# Patient Record
Sex: FEMALE | Race: ASIAN | NOT HISPANIC OR LATINO | Employment: UNEMPLOYED | URBAN - METROPOLITAN AREA
[De-identification: names, ages, dates, MRNs, and addresses within clinical notes are randomized per-mention and may not be internally consistent; named-entity substitution may affect disease eponyms.]

---

## 2017-01-04 ENCOUNTER — GENERIC CONVERSION - ENCOUNTER (OUTPATIENT)
Dept: OTHER | Facility: OTHER | Age: 52
End: 2017-01-04

## 2017-01-04 ENCOUNTER — ALLSCRIPTS OFFICE VISIT (OUTPATIENT)
Dept: OTHER | Facility: OTHER | Age: 52
End: 2017-01-04

## 2017-01-09 LAB
CULT RSLT GENITAL (HISTORICAL): NORMAL
MISCELLANEOUS LAB TEST RESULT (HISTORICAL): NORMAL

## 2017-01-10 LAB
CHLAMYDIA TRACHOMATIS BY MOL. METHOD (HISTORICAL): NEGATIVE
N GONORRHOEAE, AMPLIFIED DNA (HISTORICAL): NEGATIVE

## 2017-01-23 ENCOUNTER — ALLSCRIPTS OFFICE VISIT (OUTPATIENT)
Dept: OTHER | Facility: OTHER | Age: 52
End: 2017-01-23

## 2017-02-06 ENCOUNTER — ALLSCRIPTS OFFICE VISIT (OUTPATIENT)
Dept: OTHER | Facility: OTHER | Age: 52
End: 2017-02-06

## 2017-02-06 DIAGNOSIS — Z12.31 ENCOUNTER FOR SCREENING MAMMOGRAM FOR MALIGNANT NEOPLASM OF BREAST: ICD-10-CM

## 2017-02-06 DIAGNOSIS — Z01.419 ENCOUNTER FOR GYNECOLOGICAL EXAMINATION WITHOUT ABNORMAL FINDING: ICD-10-CM

## 2017-02-06 PROCEDURE — 87624 HPV HI-RISK TYP POOLED RSLT: CPT | Performed by: FAMILY MEDICINE

## 2017-02-06 PROCEDURE — G0145 SCR C/V CYTO,THINLAYER,RESCR: HCPCS | Performed by: FAMILY MEDICINE

## 2017-02-07 ENCOUNTER — LAB REQUISITION (OUTPATIENT)
Dept: LAB | Facility: HOSPITAL | Age: 52
End: 2017-02-07
Payer: MEDICAID

## 2017-02-07 DIAGNOSIS — Z01.419 ENCOUNTER FOR GYNECOLOGICAL EXAMINATION WITHOUT ABNORMAL FINDING: ICD-10-CM

## 2017-02-10 LAB — HPV RRNA GENITAL QL NAA+PROBE: ABNORMAL

## 2017-02-13 LAB
LAB AP GYN PRIMARY INTERPRETATION: NORMAL
Lab: NORMAL

## 2017-02-22 ENCOUNTER — GENERIC CONVERSION - ENCOUNTER (OUTPATIENT)
Dept: OTHER | Facility: OTHER | Age: 52
End: 2017-02-22

## 2017-03-08 ENCOUNTER — HOSPITAL ENCOUNTER (OUTPATIENT)
Dept: RADIOLOGY | Facility: HOSPITAL | Age: 52
Discharge: HOME | End: 2017-03-08
Attending: FAMILY MEDICINE
Payer: MEDICAID

## 2017-03-08 DIAGNOSIS — Z12.31 ENCOUNTER FOR SCREENING MAMMOGRAM FOR MALIGNANT NEOPLASM OF BREAST: ICD-10-CM

## 2017-03-08 PROCEDURE — G0202 SCR MAMMO BI INCL CAD: HCPCS

## 2017-04-06 ENCOUNTER — ALLSCRIPTS OFFICE VISIT (OUTPATIENT)
Dept: OTHER | Facility: OTHER | Age: 52
End: 2017-04-06

## 2017-05-01 ENCOUNTER — ALLSCRIPTS OFFICE VISIT (OUTPATIENT)
Dept: OTHER | Facility: OTHER | Age: 52
End: 2017-05-01

## 2017-05-15 ENCOUNTER — ALLSCRIPTS OFFICE VISIT (OUTPATIENT)
Dept: OTHER | Facility: OTHER | Age: 52
End: 2017-05-15

## 2017-06-13 ENCOUNTER — TRANSCRIBE ORDERS (OUTPATIENT)
Dept: ADMINISTRATIVE | Facility: HOSPITAL | Age: 52
End: 2017-06-13

## 2017-06-13 DIAGNOSIS — R10.2 ADNEXAL TENDERNESS, RIGHT: Primary | ICD-10-CM

## 2017-06-20 ENCOUNTER — HOSPITAL ENCOUNTER (OUTPATIENT)
Dept: RADIOLOGY | Facility: HOSPITAL | Age: 52
Discharge: HOME | End: 2017-06-20
Attending: OBSTETRICS & GYNECOLOGY
Payer: COMMERCIAL

## 2017-06-20 DIAGNOSIS — R10.2 ADNEXAL TENDERNESS, RIGHT: ICD-10-CM

## 2017-06-20 PROCEDURE — 76856 US EXAM PELVIC COMPLETE: CPT

## 2017-06-20 PROCEDURE — 76830 TRANSVAGINAL US NON-OB: CPT

## 2017-08-27 ENCOUNTER — HOSPITAL ENCOUNTER (EMERGENCY)
Facility: HOSPITAL | Age: 52
Discharge: HOME | End: 2017-08-27
Admitting: EMERGENCY MEDICINE
Payer: COMMERCIAL

## 2017-08-27 VITALS
WEIGHT: 103 LBS | OXYGEN SATURATION: 99 % | RESPIRATION RATE: 18 BRPM | DIASTOLIC BLOOD PRESSURE: 63 MMHG | TEMPERATURE: 97 F | HEART RATE: 60 BPM | SYSTOLIC BLOOD PRESSURE: 116 MMHG

## 2017-08-27 DIAGNOSIS — H10.13 ALLERGIC CONJUNCTIVITIS, BILATERAL: Primary | ICD-10-CM

## 2017-08-27 PROCEDURE — 99283 EMERGENCY DEPT VISIT LOW MDM: CPT

## 2017-08-27 RX ORDER — TETRACAINE HYDROCHLORIDE 5 MG/ML
1 SOLUTION OPHTHALMIC ONCE
Status: COMPLETED | OUTPATIENT
Start: 2017-08-27 | End: 2017-08-27

## 2017-08-27 RX ORDER — CITALOPRAM 10 MG/1
10 TABLET ORAL DAILY
COMMUNITY
End: 2018-04-23 | Stop reason: SDUPTHER

## 2017-08-27 RX ORDER — CLONAZEPAM 0.5 MG/1
0.5 TABLET ORAL DAILY
COMMUNITY
End: 2018-07-30 | Stop reason: SDUPTHER

## 2017-08-27 RX ADMIN — FLUORESCEIN SODIUM 1 STRIP: 1 STRIP OPHTHALMIC at 14:39

## 2017-08-27 RX ADMIN — TETRACAINE HYDROCHLORIDE 1 DROP: 5 SOLUTION OPHTHALMIC at 14:39

## 2017-08-30 ENCOUNTER — ALLSCRIPTS OFFICE VISIT (OUTPATIENT)
Dept: OTHER | Facility: OTHER | Age: 52
End: 2017-08-30

## 2018-01-09 NOTE — RESULT NOTES
Message  Patient presented for colposcopy  Discussed results of pap smear and current guidelines recommend either a repeat pap with cotesting in 1 year or colposcopy  Patient elected for repeat pap  Discussed with Dr Eleazar Singh and Dr Vipul Crow        Signatures   Electronically signed by : ALBERTO Martínez ; Apr 10 2017 11:56AM EST                       (Author)

## 2018-01-11 NOTE — MISCELLANEOUS
Message   Recorded as Task   Date: 02/20/2017 03:19 PM, Created By: Sheron Waddell   Task Name: Med Renewal Request   Assigned To:  Thony Salazar   Regarding Patient: Jaqui Ramirez, Status: Active   Comment:    Sheron Waddell - 20 Feb 2017 3:19 PM     TASK CREATED  PT RETURNED YOU CALL,   PLEASE CALL PT WHEN YOU ARE AVAILABLE  THANKS  [phone] CELL PHONE   Sheron Waddell - 21 Feb 2017 5:44 PM     TASK EDITED  CELL PHONE [phone] THIS NUMBER   I spoke to patient re pap results will schedule colposcopy with our office instructions      Signatures   Electronically signed by : ALBERTO Beltran ; Feb 22 2017 12:11PM EST                       (Author)

## 2018-01-12 VITALS
HEIGHT: 62 IN | WEIGHT: 102 LBS | DIASTOLIC BLOOD PRESSURE: 70 MMHG | RESPIRATION RATE: 18 BRPM | SYSTOLIC BLOOD PRESSURE: 118 MMHG | OXYGEN SATURATION: 100 % | TEMPERATURE: 98.1 F | HEART RATE: 69 BPM | BODY MASS INDEX: 18.77 KG/M2

## 2018-01-13 VITALS
BODY MASS INDEX: 19.32 KG/M2 | HEART RATE: 68 BPM | RESPIRATION RATE: 18 BRPM | WEIGHT: 105 LBS | SYSTOLIC BLOOD PRESSURE: 90 MMHG | DIASTOLIC BLOOD PRESSURE: 60 MMHG | HEIGHT: 62 IN | OXYGEN SATURATION: 98 %

## 2018-01-13 VITALS
TEMPERATURE: 98.8 F | BODY MASS INDEX: 19.14 KG/M2 | HEART RATE: 76 BPM | HEIGHT: 62 IN | SYSTOLIC BLOOD PRESSURE: 100 MMHG | DIASTOLIC BLOOD PRESSURE: 60 MMHG | RESPIRATION RATE: 16 BRPM | WEIGHT: 104 LBS

## 2018-01-14 VITALS
DIASTOLIC BLOOD PRESSURE: 40 MMHG | HEIGHT: 62 IN | RESPIRATION RATE: 18 BRPM | HEART RATE: 75 BPM | BODY MASS INDEX: 18.95 KG/M2 | OXYGEN SATURATION: 99 % | SYSTOLIC BLOOD PRESSURE: 90 MMHG | WEIGHT: 103 LBS

## 2018-01-14 VITALS
SYSTOLIC BLOOD PRESSURE: 100 MMHG | TEMPERATURE: 97.4 F | WEIGHT: 106 LBS | OXYGEN SATURATION: 98 % | BODY MASS INDEX: 19.51 KG/M2 | RESPIRATION RATE: 20 BRPM | DIASTOLIC BLOOD PRESSURE: 60 MMHG | HEART RATE: 78 BPM | HEIGHT: 62 IN

## 2018-01-14 VITALS
WEIGHT: 106.44 LBS | HEIGHT: 62 IN | HEART RATE: 66 BPM | SYSTOLIC BLOOD PRESSURE: 96 MMHG | TEMPERATURE: 96 F | DIASTOLIC BLOOD PRESSURE: 62 MMHG | RESPIRATION RATE: 20 BRPM | OXYGEN SATURATION: 99 % | BODY MASS INDEX: 19.59 KG/M2

## 2018-01-14 NOTE — PROGRESS NOTES
Assessment    1  Cough (786 2) (R05)   2  Encounter for smoking cessation counseling (V65 42,305 1) (Z71 6,Z72 0)    Plan  Cough    · Benzonatate 100 MG Oral Capsule; TAKE 1 CAPSULE 3 TIMES DAILY AS  NEEDED   · Follow-up visit in 3 months Evaluation and Treatment  Follow-up  Status: Hold For -  Scheduling  Requested for: [id number]    Discussion/Summary    Will try benzozenate cont sx rx will f/u with pfts and cxr if no improvement discussed smoking cessation will try patch  Chief Complaint    1  Cold Symptoms  Cough x 2 months      History of Present Illness  HPI: 53year old F presents to the office for a cough that has lasted since a week after Thanksgiving  The cough was dry for the entire duration up until yesterday when she had a clear sputum  Denies fever, SOB, wheezing, CP, nasal congestion, and headaches  The patient does have midsternal tenderness and sore throat due to the cough  The patient believes it is due to her smoking and is interested in cutting down and possibly quitting  otherwise had seen gyn had tests done everything ok        Review of Systems    Constitutional: No fever, no chills, feels well, no tiredness, no recent weight gain or loss  ENT: no ear ache, no loss of hearing, no nosebleeds or nasal discharge, no sore throat or hoarseness  Cardiovascular: no complaints of slow or fast heart rate, no chest pain, no palpitations, no leg claudication or lower extremity edema  Respiratory: as noted in HPI  Gastrointestinal: no complaints of abdominal pain, no constipation, no nausea or diarrhea, no vomiting, no bloody stools  Genitourinary: no complaints of dysuria, no incontinence, no pelvic pain, no dysmenorrhea, no vaginal discharge or abnormal vaginal bleeding  Musculoskeletal: no complaints of arthralgia, no myalgia, no joint swelling or stiffness, no limb pain or swelling  Integumentary: no complaints of skin rash or lesion, no itching or dry skin, no skin wounds  Neurological: no complaints of headache, no confusion, no numbness or tingling, no dizziness or fainting  Active Problems    1  Acne (706 1) (L70 9)   2  Depression with anxiety (300 4) (F41 8)   3  Genital lesion, female (629 89) (N94 9)   4  Hyperlipemia, mixed (272 2) (E78 2)   5  Psoriasis (696 1) (L40 9)    Family History    1  Family history of diabetes mellitus (V18 0) (Z83 3)    2  Family history of hypercholesterolemia (V18 19) (Z83 49)    Social History    · Never smoker  The social history was reviewed and is unchanged  Current Meds   1  Citalopram Hydrobromide 20 MG Oral Tablet; TAKE 1 TABLET DAILY AS DIRECTED; Therapy: 22Dvr1034 to (Evaluate:55Ata0147)  Requested for: 11Aug2015; Last   Rx:11Aug2015 Ordered   2  ClonazePAM 0 5 MG Oral Tablet; TAKE ONE TABLET BY MOUTH ONCE DAILY; Therapy: 13HXK9800 to (Evaluate:33Vby3346)  Requested for: 11Aug2015; Last   Rx:11Aug2015 Ordered   3  Finacea 15 % External Gel; USE AS DIRECTED; Therapy: 96Hpk2650 to (Last Rx:32Whb5114) Ordered    The medication list was reviewed and updated today  Allergies    1  No Known Drug Allergies    Vitals   Recorded: 06HEX8575 09:38AM   Temperature 98 5 F   Heart Rate 72   Respiration 20   Systolic 94   Diastolic 64   Height 5 ft 2 in   Weight 107 lb    BMI Calculated 19 57   BSA Calculated 1 47   O2 Saturation 99     Physical Exam    Constitutional   General appearance: No acute distress, well appearing and well nourished  Eyes   Conjunctiva and lids: No swelling, erythema or discharge  Ears, Nose, Mouth, and Throat   External inspection of ears and nose: Normal     Otoscopic examination: Tympanic membranes translucent with normal light reflex  Canals patent without erythema  Nasal mucosa, septum, and turbinates: Normal without edema or erythema  Oropharynx: Normal with no erythema, edema, exudate or lesions  Pulmonary   Auscultation of lungs: Clear to auscultation      Cardiovascular Auscultation of heart: Normal rate and rhythm, normal S1 and S2, without murmurs  Examination of extremities for edema and/or varicosities: Normal     Abdomen   Abdomen: Non-tender, no masses  Liver and spleen: No hepatomegaly or splenomegaly  Musculoskeletal neg  Skin   Skin and subcutaneous tissue: Normal without rashes or lesions  Neurologic no focl findings     Psychiatric   Orientation to person, place, and time: Normal     Mood and affect: Normal          Results/Data  PHQ-2 Adult Depression Screening 28Jan2016 09:41AM User, A[facility]     Test Name Result Flag Reference   PHQ-2 Adult Depression Score 0     Q1: 0, Q2: 0   PHQ-2 Adult Depression Screening Negative       [employer]Calcs - Health Calculators 16RPA1622 09:41AM User, [facility]     Test Name Result Flag Reference   SBIRT Screen - Tobacco Screening Result Positive         Signatures   Electronically signed by : ALBERTO Juan ; Jan 28 2016 12:43PM EST                       (Author)

## 2018-01-15 VITALS
HEART RATE: 74 BPM | BODY MASS INDEX: 19.51 KG/M2 | DIASTOLIC BLOOD PRESSURE: 62 MMHG | RESPIRATION RATE: 18 BRPM | WEIGHT: 106 LBS | SYSTOLIC BLOOD PRESSURE: 104 MMHG | HEIGHT: 62 IN

## 2018-01-18 ENCOUNTER — ALLSCRIPTS OFFICE VISIT (OUTPATIENT)
Dept: OTHER | Facility: OTHER | Age: 53
End: 2018-01-18

## 2018-01-18 ENCOUNTER — GENERIC CONVERSION - ENCOUNTER (OUTPATIENT)
Dept: OTHER | Facility: OTHER | Age: 53
End: 2018-01-18

## 2018-01-24 VITALS
OXYGEN SATURATION: 99 % | DIASTOLIC BLOOD PRESSURE: 70 MMHG | HEART RATE: 60 BPM | TEMPERATURE: 97.8 F | WEIGHT: 104 LBS | SYSTOLIC BLOOD PRESSURE: 100 MMHG | BODY MASS INDEX: 19.02 KG/M2 | RESPIRATION RATE: 16 BRPM

## 2018-03-08 ENCOUNTER — TELEPHONE (OUTPATIENT)
Dept: GASTROENTEROLOGY | Facility: AMBULARY SURGERY CENTER | Age: 53
End: 2018-03-08

## 2018-03-14 ENCOUNTER — TRANSCRIBE ORDERS (OUTPATIENT)
Dept: ADMINISTRATIVE | Facility: HOSPITAL | Age: 53
End: 2018-03-14

## 2018-03-14 DIAGNOSIS — Z12.39 SCREENING BREAST EXAMINATION: Primary | ICD-10-CM

## 2018-03-19 ENCOUNTER — HOSPITAL ENCOUNTER (OUTPATIENT)
Dept: RADIOLOGY | Facility: HOSPITAL | Age: 53
Discharge: HOME | End: 2018-03-19
Attending: FAMILY MEDICINE
Payer: COMMERCIAL

## 2018-03-19 DIAGNOSIS — Z12.39 SCREENING BREAST EXAMINATION: ICD-10-CM

## 2018-03-19 PROCEDURE — 77067 SCR MAMMO BI INCL CAD: CPT

## 2018-03-23 ENCOUNTER — TELEPHONE (OUTPATIENT)
Dept: FAMILY MEDICINE CLINIC | Facility: CLINIC | Age: 53
End: 2018-03-23

## 2018-03-23 DIAGNOSIS — Z12.11 SCREENING FOR COLON CANCER: Primary | ICD-10-CM

## 2018-04-18 ENCOUNTER — TELEPHONE (OUTPATIENT)
Dept: GASTROENTEROLOGY | Facility: CLINIC | Age: 53
End: 2018-04-18

## 2018-04-23 DIAGNOSIS — F41.9 ANXIETY: Primary | ICD-10-CM

## 2018-04-23 RX ORDER — CITALOPRAM 10 MG/1
10 TABLET ORAL DAILY
Qty: 30 TABLET | Refills: 5 | Status: SHIPPED | OUTPATIENT
Start: 2018-04-23 | End: 2018-10-11 | Stop reason: SDUPTHER

## 2018-05-10 ENCOUNTER — OFFICE VISIT (OUTPATIENT)
Dept: FAMILY MEDICINE CLINIC | Facility: CLINIC | Age: 53
End: 2018-05-10
Payer: COMMERCIAL

## 2018-05-10 VITALS
DIASTOLIC BLOOD PRESSURE: 60 MMHG | TEMPERATURE: 97.7 F | SYSTOLIC BLOOD PRESSURE: 100 MMHG | BODY MASS INDEX: 18.58 KG/M2 | HEART RATE: 84 BPM | HEIGHT: 62 IN | WEIGHT: 101 LBS | OXYGEN SATURATION: 100 %

## 2018-05-10 DIAGNOSIS — R09.82 POST-NASAL DRAINAGE: Primary | ICD-10-CM

## 2018-05-10 PROCEDURE — 3008F BODY MASS INDEX DOCD: CPT | Performed by: FAMILY MEDICINE

## 2018-05-10 PROCEDURE — 99213 OFFICE O/P EST LOW 20 MIN: CPT | Performed by: FAMILY MEDICINE

## 2018-05-10 RX ORDER — DOXYCYCLINE HYCLATE 100 MG/1
CAPSULE ORAL
Refills: 0 | COMMUNITY
Start: 2018-02-02 | End: 2018-07-23 | Stop reason: ALTCHOICE

## 2018-05-10 RX ORDER — FLUTICASONE PROPIONATE 50 MCG
1 SPRAY, SUSPENSION (ML) NASAL DAILY
Qty: 16 G | Refills: 0 | Status: SHIPPED | OUTPATIENT
Start: 2018-05-10 | End: 2018-07-23 | Stop reason: ALTCHOICE

## 2018-05-10 NOTE — PROGRESS NOTES
Assessment/Plan:    Post-nasal drainage  A/P:Patient complains dry cough likely secondary to postnasal drip  Physical exam was benign except erythematous and edematous nasal turbinates  Will start on Flonase which was called in during this visit  Diagnoses and all orders for this visit:    Post-nasal drainage  -     fluticasone (FLONASE) 50 mcg/act nasal spray; 1 spray into each nostril daily    Other orders  -     doxycycline hyclate (VIBRAMYCIN) 100 mg capsule;           Subjective:      Patient ID: Lotus Avila is a 47 y o  female  Patient is a 60-year-old female here for cold symptoms for 5 days  According to patient, she started out with runny nose, and later dry cough  Her runny nose improved however she now has congestion, frontal headache and some postnasal dripping  Denies any fever, sore throat, SOB, chest pain, nausea, vomiting, or diarrhea  The following portions of the patient's history were reviewed and updated as appropriate: allergies, current medications, past family history, past medical history, past social history, past surgical history and problem list     Review of Systems   Constitutional: Negative for fever  HENT: Positive for congestion and postnasal drip  Eyes: Negative  Respiratory: Negative for chest tightness and shortness of breath  Cardiovascular: Negative for chest pain  Gastrointestinal: Negative  Musculoskeletal: Negative  Skin: Negative  Neurological: Positive for headaches  Negative for light-headedness  Psychiatric/Behavioral: Negative  Objective:      /60   Pulse 84   Temp 97 7 °F (36 5 °C) (Tympanic)   Ht 5' 2" (1 575 m)   Wt 45 8 kg (101 lb)   SpO2 100%   BMI 18 47 kg/m²          Physical Exam   Constitutional: She is oriented to person, place, and time  She appears well-developed and well-nourished  No distress  HENT:   Head: Normocephalic and atraumatic     Nose: Nose normal    Mouth/Throat: Oropharynx is clear and moist  No oropharyngeal exudate  Bilateral edematous and erythematous nasal turbinates  Eyes: Conjunctivae and EOM are normal  Pupils are equal, round, and reactive to light  No scleral icterus  Neck: Normal range of motion  Neck supple  No thyromegaly present  Cardiovascular: Normal rate, regular rhythm and normal heart sounds  Pulmonary/Chest: Effort normal and breath sounds normal  No respiratory distress  She has no wheezes  She has no rales  She exhibits no tenderness  Abdominal: Soft  Bowel sounds are normal  She exhibits no distension and no mass  There is no tenderness  There is no rebound and no guarding  Musculoskeletal: Normal range of motion  She exhibits no edema, tenderness or deformity  Lymphadenopathy:     She has no cervical adenopathy  Neurological: She is alert and oriented to person, place, and time  Skin: Skin is warm and dry  No rash noted  No erythema  No pallor  Psychiatric: She has a normal mood and affect  Nursing note and vitals reviewed

## 2018-05-14 ENCOUNTER — HOSPITAL ENCOUNTER (EMERGENCY)
Facility: HOSPITAL | Age: 53
Discharge: HOME | End: 2018-05-14
Attending: EMERGENCY MEDICINE
Payer: COMMERCIAL

## 2018-05-14 ENCOUNTER — APPOINTMENT (EMERGENCY)
Dept: RADIOLOGY | Facility: HOSPITAL | Age: 53
End: 2018-05-14
Payer: COMMERCIAL

## 2018-05-14 VITALS
HEIGHT: 62 IN | HEART RATE: 78 BPM | DIASTOLIC BLOOD PRESSURE: 61 MMHG | TEMPERATURE: 98.4 F | OXYGEN SATURATION: 100 % | SYSTOLIC BLOOD PRESSURE: 137 MMHG | BODY MASS INDEX: 19.32 KG/M2 | WEIGHT: 105 LBS | RESPIRATION RATE: 18 BRPM

## 2018-05-14 DIAGNOSIS — S16.1XXA NECK MUSCLE STRAIN: Primary | ICD-10-CM

## 2018-05-14 PROCEDURE — 96372 THER/PROPH/DIAG INJ SC/IM: CPT

## 2018-05-14 PROCEDURE — 99283 EMERGENCY DEPT VISIT LOW MDM: CPT

## 2018-05-14 PROCEDURE — 72125 CT NECK SPINE W/O DYE: CPT

## 2018-05-14 RX ORDER — KETOROLAC TROMETHAMINE 30 MG/ML
60 INJECTION, SOLUTION INTRAMUSCULAR; INTRAVENOUS ONCE
Status: COMPLETED | OUTPATIENT
Start: 2018-05-14 | End: 2018-05-14

## 2018-05-14 RX ORDER — CYCLOBENZAPRINE HCL 10 MG
10 TABLET ORAL 2 TIMES DAILY PRN
Qty: 20 TABLET | Refills: 0 | Status: SHIPPED | OUTPATIENT
Start: 2018-05-14 | End: 2018-07-23 | Stop reason: ALTCHOICE

## 2018-05-14 RX ORDER — NAPROXEN 500 MG/1
500 TABLET ORAL 2 TIMES DAILY WITH MEALS
Qty: 30 TABLET | Refills: 0 | Status: SHIPPED | OUTPATIENT
Start: 2018-05-14 | End: 2018-07-23 | Stop reason: ALTCHOICE

## 2018-05-14 RX ADMIN — KETOROLAC TROMETHAMINE 60 MG: 30 INJECTION, SOLUTION INTRAMUSCULAR at 13:46

## 2018-05-14 NOTE — DISCHARGE INSTRUCTIONS
Muscle Strain   WHAT YOU NEED TO KNOW:   A muscle strain is a twist, pull, or tear of a muscle or tendon  A tendon is a strong elastic tissue that connects a muscle to a bone  Signs of a strained muscle include bruising and swelling over the area, pain with movement, and loss of strength  DISCHARGE INSTRUCTIONS:   Return to the emergency department if:   · You suddenly cannot feel or move your injured muscle  Contact your healthcare provider if:   · Your pain and swelling worsen or do not go away  · You have questions or concerns about your condition or care  Medicines:   · NSAIDs  help decrease swelling and pain or fever  This medicine is available with or without a doctor's order  NSAIDs can cause stomach bleeding or kidney problems in certain people  If you take blood thinner medicine, always ask your healthcare provider if NSAIDs are safe for you  Always read the medicine label and follow directions  · Muscle relaxers  help decrease pain and muscle spasms  · Take your medicine as directed  Contact your healthcare provider if you think your medicine is not helping or if you have side effects  Tell him of her if you are allergic to any medicine  Keep a list of the medicines, vitamins, and herbs you take  Include the amounts, and when and why you take them  Bring the list or the pill bottles to follow-up visits  Carry your medicine list with you in case of an emergency  Follow up with your healthcare provider as directed: Your healthcare provider may suggest that you have a follow-up visit before you go back to your usual activity  Write down your questions so you remember to ask them during your visits  Self-care:   · 3 to 7 days after the injury:  Use Rest, Ice, Compression, and Elevation (RICE) to help stop bruising and decrease pain and swelling  ¨ Rest:  Rest your muscle to allow your injury to heal  When the pain decreases, begin normal, slow movements   For mild and moderate muscle strains, you should rest your muscles for about 2 days  However, if you have a severe muscle strain, you should rest for 10 to 14 days  You may need to use crutches to walk if your muscle strain is in your legs or lower body  ¨ Ice:  Put an ice pack on the injured area  Put a towel between the ice pack and your skin  Do not put the ice pack directly on your skin  You can use a package of frozen peas instead of an ice pack  ¨ Compression:  You may need to wrap an elastic bandage around the area to decrease swelling  It should be tight enough for you to feel support  Do not wrap it too tightly  ¨ Elevation:  Keep the injured muscle raised above your heart if possible  For example if you have a strain of your lower leg muscle, lie down and prop your leg up on pillows  This helps decrease pain and swelling  · 3 to 21 days after the injury:  Start to slowly and regularly exercise your muscle  This will help it heal  If you feel pain, decrease how hard you are exercising  · 1 to 6 weeks after the injury:  Stretch the injured muscle  Hold the stretch for about 30 seconds  Do this 4 times a day  You may stretch the muscle until you feel a slight pull  Stop stretching if you feel pain  · 2 weeks to 6 months after the injury:  The goal of this phase is to return to the activity you were doing before the injury happened, without hurting the muscle again  · 3 weeks to 6 months after the injury:  Keep stretching and strengthening your muscles to avoid injury  Slowly increase the time and distance that you exercise  You may have signs and symptoms of muscle strain 6 months after the injury, even if you do things to help it heal  In this case, you may need surgery on the muscle  © 2017 [employer] Information is for End User's use only and may not be sold, redistributed or otherwise used for commercial purposes   All illustrations and images included in CareNotes® are the copyrighted property of [employer]  or [employer]  The above information is an  only  It is not intended as medical advice for individual conditions or treatments  Talk to your doctor, nurse or pharmacist before following any medical regimen to see if it is safe and effective for you

## 2018-05-15 ENCOUNTER — VBI (OUTPATIENT)
Dept: FAMILY MEDICINE CLINIC | Facility: CLINIC | Age: 53
End: 2018-05-15

## 2018-05-15 NOTE — TELEPHONE ENCOUNTER
Pt was seen in [address] on 5/14/18  CC: Neck Pain  DX: Neck muscle strain Post-nasal drainage  Left message  Informed Pt of  on call, hours, and phone number

## 2018-05-29 ENCOUNTER — OFFICE VISIT (OUTPATIENT)
Dept: FAMILY MEDICINE CLINIC | Facility: CLINIC | Age: 53
End: 2018-05-29
Payer: COMMERCIAL

## 2018-05-29 VITALS
WEIGHT: 102 LBS | RESPIRATION RATE: 16 BRPM | SYSTOLIC BLOOD PRESSURE: 100 MMHG | TEMPERATURE: 97.5 F | BODY MASS INDEX: 18.66 KG/M2 | DIASTOLIC BLOOD PRESSURE: 60 MMHG

## 2018-05-29 DIAGNOSIS — R05.9 COUGH IN ADULT: Primary | ICD-10-CM

## 2018-05-29 PROBLEM — R87.811 VAGINAL HIGH RISK HPV DNA TEST POSITIVE: Status: ACTIVE | Noted: 2017-02-22

## 2018-05-29 PROBLEM — R46.81 OBSESSIVE-COMPULSIVE BEHAVIOR: Status: ACTIVE | Noted: 2017-01-23

## 2018-05-29 PROBLEM — L71.9 ROSACEA: Status: ACTIVE | Noted: 2017-02-06

## 2018-05-29 PROBLEM — N95.1 PERI-MENOPAUSE: Status: ACTIVE | Noted: 2017-05-15

## 2018-05-29 PROCEDURE — 99213 OFFICE O/P EST LOW 20 MIN: CPT | Performed by: FAMILY MEDICINE

## 2018-05-29 NOTE — PROGRESS NOTES
Assessment/Plan:    No problem-specific Assessment & Plan notes found for this encounter  Diagnoses and all orders for this visit:    Cough in adult       - No physical abnormalities noted on examination  Patient has remained afebrile, has a cough, no lymphadenopathy, no pharyngeal erythema or exudate noted on exam today  Likely viral  No need for antibiotics at this time  - Counseled patient to monitor for fevers  - Counseled the patient on supportive care which includes honey/lemon tea, warm water, salt water gargle  - Counseled patient on hand washing hygiene  - Discussed with patient that cough may persist for up to 6-8 weeks  Patient verbalized understanding  - Counseled patient to increase hydration  - Return precautions given      Subjective:    Patient ID: Manuel Arnold is a 47 y o  female who presents today with complaints of scratchy throat and clogged ears, nonproductive cough x 2-3 weeks  Patient reports was prescribed Flonase and hasn't been using it as she isn't a believer in medications  Patient denies fever, chills, abdominal pain, nausea, vomiting, diarrhea, dysuria  Tolerating oral intake well  No skin rashes  No history of ill contacts  No recent travel out of the country  Patient reports has been taking doxycyline for cystic ance for a few months now  Patient reports no new medications  Patient reports also follows up with Dr Denise Camarena and wants something for ance  No history of swimming  HPI    The following portions of the patient's history were reviewed and updated as appropriate:   She  has no past medical history on file    She   Patient Active Problem List    Diagnosis Date Noted    Cough in adult 05/29/2018    Post-nasal drainage 05/10/2018    Hermila-menopause 05/15/2017    Vaginal high risk HPV DNA test positive 02/22/2017    Rosacea 02/06/2017    Obsessive-compulsive behavior 01/23/2017    Allergic rhinitis 07/26/2016    Hyperlipemia, mixed 09/02/2014    Psoriasis 09/02/2014  Depression with anxiety 12/23/2013     She  has no past surgical history on file  Her family history is not on file  She  reports that she has been smoking  She has been smoking about 0 50 packs per day  She has never used smokeless tobacco  She reports that she does not drink alcohol or use drugs  Current Outpatient Prescriptions   Medication Sig Dispense Refill    citalopram (CeleXA) 10 mg tablet Take 1 tablet (10 mg total) by mouth daily 30 tablet 5    clonazePAM (KlonoPIN) 0 5 mg tablet Take 0 5 mg by mouth daily      doxycycline hyclate (VIBRAMYCIN) 100 mg capsule   0    fluticasone (FLONASE) 50 mcg/act nasal spray 1 spray into each nostril daily 16 g 0    cyclobenzaprine (FLEXERIL) 10 mg tablet Take 1 tablet (10 mg total) by mouth 2 (two) times a day as needed for muscle spasms 20 tablet 0    naproxen (NAPROSYN) 500 mg tablet Take 1 tablet (500 mg total) by mouth 2 (two) times a day with meals 30 tablet 0     No current facility-administered medications for this visit  Current Outpatient Prescriptions on File Prior to Visit   Medication Sig    citalopram (CeleXA) 10 mg tablet Take 1 tablet (10 mg total) by mouth daily    clonazePAM (KlonoPIN) 0 5 mg tablet Take 0 5 mg by mouth daily    doxycycline hyclate (VIBRAMYCIN) 100 mg capsule     fluticasone (FLONASE) 50 mcg/act nasal spray 1 spray into each nostril daily    cyclobenzaprine (FLEXERIL) 10 mg tablet Take 1 tablet (10 mg total) by mouth 2 (two) times a day as needed for muscle spasms    naproxen (NAPROSYN) 500 mg tablet Take 1 tablet (500 mg total) by mouth 2 (two) times a day with meals     No current facility-administered medications on file prior to visit  She has No Known Allergies       Review of Systems   Constitutional: Negative for activity change, appetite change, chills and fatigue  HENT: Positive for sore throat   Negative for ear discharge, ear pain, hearing loss, postnasal drip, rhinorrhea, sinus pain, sinus pressure and sneezing  Clogged ears     Respiratory: Positive for cough  Negative for shortness of breath and wheezing  Cardiovascular: Negative for chest pain, palpitations and leg swelling  Gastrointestinal: Negative for abdominal pain, diarrhea and nausea  Genitourinary: Negative for decreased urine volume, dysuria and urgency  Musculoskeletal: Negative for back pain and gait problem  Skin: Negative for pallor and rash  Psychiatric/Behavioral: Negative for behavioral problems, decreased concentration, hallucinations and sleep disturbance  The patient is not nervous/anxious  Objective:      /60 (BP Location: Right arm, Patient Position: Sitting, Cuff Size: Standard)   Temp 97 5 °F (36 4 °C) (Tympanic)   Resp 16   Wt 46 3 kg (102 lb)   BMI 18 66 kg/m²          Physical Exam   Constitutional: She is oriented to person, place, and time  She appears well-developed and well-nourished  No distress  HENT:   Head: Normocephalic and atraumatic  Right Ear: Hearing, tympanic membrane, external ear and ear canal normal  Tympanic membrane is not perforated and not bulging  Left Ear: Hearing, tympanic membrane, external ear and ear canal normal  Tympanic membrane is not perforated and not bulging  Nose: Nose normal  Right sinus exhibits no frontal sinus tenderness  Left sinus exhibits no frontal sinus tenderness  Mouth/Throat: Uvula is midline, oropharynx is clear and moist and mucous membranes are normal  Mucous membranes are not pale, not dry and not cyanotic  No posterior oropharyngeal edema or posterior oropharyngeal erythema  Eyes: Conjunctivae are normal    Neck: Neck supple  No thyromegaly present  Cardiovascular: Normal rate, regular rhythm and normal heart sounds  Exam reveals no gallop and no friction rub  No murmur heard  Pulmonary/Chest: Effort normal and breath sounds normal  No respiratory distress  She has no wheezes  She has no rales   She exhibits no tenderness  Lymphadenopathy:     She has no cervical adenopathy  Neurological: She is alert and oriented to person, place, and time  Skin: She is not diaphoretic  Psychiatric: She has a normal mood and affect  Her behavior is normal  Judgment and thought content normal    Nursing note and vitals reviewed          Christiano Kapoor

## 2018-07-23 ENCOUNTER — OFFICE VISIT (OUTPATIENT)
Dept: FAMILY MEDICINE CLINIC | Facility: CLINIC | Age: 53
End: 2018-07-23
Payer: COMMERCIAL

## 2018-07-23 VITALS
TEMPERATURE: 97.6 F | BODY MASS INDEX: 18.14 KG/M2 | HEART RATE: 80 BPM | WEIGHT: 99.2 LBS | SYSTOLIC BLOOD PRESSURE: 90 MMHG | RESPIRATION RATE: 16 BRPM | DIASTOLIC BLOOD PRESSURE: 60 MMHG

## 2018-07-23 DIAGNOSIS — B96.89 BV (BACTERIAL VAGINOSIS): Primary | ICD-10-CM

## 2018-07-23 DIAGNOSIS — N76.0 BV (BACTERIAL VAGINOSIS): Primary | ICD-10-CM

## 2018-07-23 DIAGNOSIS — N89.8 VAGINAL DISCHARGE: ICD-10-CM

## 2018-07-23 PROCEDURE — 99214 OFFICE O/P EST MOD 30 MIN: CPT | Performed by: FAMILY MEDICINE

## 2018-07-23 RX ORDER — METRONIDAZOLE 500 MG/1
500 TABLET ORAL EVERY 12 HOURS SCHEDULED
Qty: 14 TABLET | Refills: 0 | Status: SHIPPED | OUTPATIENT
Start: 2018-07-23 | End: 2018-07-30

## 2018-07-23 RX ORDER — SULFAMETHOXAZOLE AND TRIMETHOPRIM 800; 160 MG/1; MG/1
1 TABLET ORAL 2 TIMES DAILY
Refills: 0 | COMMUNITY
Start: 2018-07-05 | End: 2018-07-23 | Stop reason: ALTCHOICE

## 2018-07-23 RX ORDER — INDAPAMIDE 1.25 MG
TABLET ORAL
Refills: 0 | COMMUNITY
Start: 2018-07-03 | End: 2019-10-21 | Stop reason: ALTCHOICE

## 2018-07-23 NOTE — ASSESSMENT & PLAN NOTE
- Metronidazole 500mg BID for 7 days  - KOH negative for hyphae, wet mount few clue cells  - GC/cultures sent

## 2018-07-23 NOTE — PROGRESS NOTES
Assessment/Plan:    BV (bacterial vaginosis)  - Metronidazole 500mg BID for 7 days  - KOH negative for hyphae, wet mount few clue cells  - GC/cultures sent     Diagnoses and all orders for this visit:    BV (bacterial vaginosis)  -     metroNIDAZOLE (FLAGYL) 500 mg tablet; Take 1 tablet (500 mg total) by mouth every 12 (twelve) hours for 7 days    Vaginal discharge  -     Genital Comprehensive Culture  -     Chlamydia/GC amplified DNA by PCR    Other orders  -     FINACEA 15 % cream;   -     Discontinue: sulfamethoxazole-trimethoprim (BACTRIM DS) 800-160 mg per tablet; Take 1 tablet by mouth 2 (two) times a day        Subjective:      Patient ID: Mindi Madsen is a 47 y o  female  HPI  This is a 47year old female coming in due to a possible vaginal infection, she thinks either yeast or BV  Patient has been having symptoms of itching and discharge since she went swimming last Thursday  Since then the discharge has developed an odor and has become more uncomfortable  She was last treated for a yeast/BV infection in Feb 2017 with topical metronidazole  Sexually active with one male partner  Patient is on daily Bactrim for cystic acne  Denies urinary symptoms  Menstrual cycle every three months, no hot flashes or symptoms of menopause  The following portions of the patient's history were reviewed and updated as appropriate: allergies, current medications, past family history, past medical history, past social history, past surgical history and problem list     Review of Systems   Constitutional: Negative for fatigue, fever and unexpected weight change  HENT: Negative for rhinorrhea, sneezing and sore throat  Respiratory: Negative for cough, shortness of breath and wheezing  Cardiovascular: Negative for chest pain and leg swelling  Gastrointestinal: Negative for abdominal pain, constipation, diarrhea, nausea and vomiting  Genitourinary: Negative for dysuria, frequency, pelvic pain and urgency  Neurological: Negative for dizziness, light-headedness and headaches  Objective:  BP 90/60   Pulse 80   Temp 97 6 °F (36 4 °C)   Resp 16   Wt 45 kg (99 lb 3 2 oz)   BMI 18 14 kg/m²      Physical Exam   Constitutional: She is oriented to person, place, and time  She appears well-developed and well-nourished  HENT:   Head: Normocephalic and atraumatic  Cardiovascular: Normal rate, regular rhythm and normal heart sounds  Exam reveals no gallop and no friction rub  No murmur heard  Pulmonary/Chest: Effort normal and breath sounds normal  No respiratory distress  Abdominal: Soft  Bowel sounds are normal    Genitourinary: There is no tenderness or lesion on the right labia  There is no tenderness or lesion on the left labia  No erythema or bleeding in the vagina  Vaginal discharge found  Neurological: She is alert and oriented to person, place, and time

## 2018-07-26 LAB
BACTERIA GENITAL AEROBE CULT: NORMAL
C TRACH RRNA SPEC QL NAA+PROBE: NEGATIVE
Lab: NORMAL
N GONORRHOEA RRNA SPEC QL NAA+PROBE: NEGATIVE

## 2018-07-30 DIAGNOSIS — F41.9 ANXIETY: Primary | ICD-10-CM

## 2018-07-30 RX ORDER — CLONAZEPAM 0.5 MG/1
0.5 TABLET ORAL DAILY
Qty: 60 TABLET | Refills: 5 | OUTPATIENT
Start: 2018-07-30 | End: 2019-04-01 | Stop reason: SDUPTHER

## 2018-08-16 ENCOUNTER — TELEPHONE (OUTPATIENT)
Dept: FAMILY MEDICINE CLINIC | Facility: CLINIC | Age: 53
End: 2018-08-16

## 2018-08-16 NOTE — TELEPHONE ENCOUNTER
Dr Duran Umanzor-  Pt came in thinking her appt today for  Med check was monique for  6pm when in fact it was 9:15 am  I offered to r/s with one of the other providers on the [facility] [place] team  She wants to be sure you are okay with this  If not she will wait until 9/2018   Please advise

## 2018-08-17 NOTE — TELEPHONE ENCOUNTER
She has not rescheduled as of yet, she wanted to be sure that was okay by you because of her meds  She will need a med refill for after the month of aug

## 2018-10-11 ENCOUNTER — OFFICE VISIT (OUTPATIENT)
Dept: FAMILY MEDICINE CLINIC | Facility: CLINIC | Age: 53
End: 2018-10-11
Payer: COMMERCIAL

## 2018-10-11 VITALS
SYSTOLIC BLOOD PRESSURE: 98 MMHG | DIASTOLIC BLOOD PRESSURE: 56 MMHG | RESPIRATION RATE: 16 BRPM | OXYGEN SATURATION: 99 % | WEIGHT: 101 LBS | HEART RATE: 85 BPM | BODY MASS INDEX: 18.47 KG/M2

## 2018-10-11 DIAGNOSIS — L71.9 ROSACEA: ICD-10-CM

## 2018-10-11 DIAGNOSIS — R87.811 VAGINAL HIGH RISK HPV DNA TEST POSITIVE: ICD-10-CM

## 2018-10-11 DIAGNOSIS — E78.2 MIXED HYPERLIPIDEMIA: ICD-10-CM

## 2018-10-11 DIAGNOSIS — F41.9 ANXIETY: Primary | ICD-10-CM

## 2018-10-11 PROCEDURE — 99213 OFFICE O/P EST LOW 20 MIN: CPT | Performed by: FAMILY MEDICINE

## 2018-10-11 RX ORDER — CITALOPRAM 10 MG/1
10 TABLET ORAL DAILY
Qty: 30 TABLET | Refills: 5 | Status: SHIPPED | OUTPATIENT
Start: 2018-10-11 | End: 2019-03-18 | Stop reason: SDUPTHER

## 2018-10-11 NOTE — PROGRESS NOTES
Assessment/Plan:    Cont pres meds refill celexa  F/u with eye doc and gyn  Refuses flu vacc     There are no diagnoses linked to this encounter  Subjective:      Patient ID: Guido Hair is a 47 y o  female  Patient seen in f/u has been doing okay   Has been [facility]  Needs refill on celexa has stopped rosacea meds  Due for labs  is f/u with gyn re hpv issue no other new issues needs to see eye doc        The following portions of the patient's history were reviewed and updated as appropriate: past family history, past medical history, past social history and past surgical history  Review of Systems   Constitutional: Negative  HENT: Negative  Eyes:        See hpi   Respiratory: Negative  Cardiovascular: Negative  Gastrointestinal: Negative  Endocrine: Negative  Genitourinary:        See hpi   Musculoskeletal: Negative  Skin:        See hpi   Neurological: Negative  Psychiatric/Behavioral: Negative  Objective:      BP 98/56   Pulse 85   Resp 16   Wt 45 8 kg (101 lb)   SpO2 99%   BMI 18 47 kg/m²          Physical Exam   Constitutional: She is oriented to person, place, and time  She appears well-developed and well-nourished  HENT:   Head: Normocephalic and atraumatic  Right Ear: External ear normal    Left Ear: External ear normal    Eyes: Pupils are equal, round, and reactive to light  Conjunctivae and EOM are normal    Neck: Normal range of motion  Neck supple  Cardiovascular: Normal rate, regular rhythm and normal heart sounds  Pulmonary/Chest: Effort normal and breath sounds normal    Abdominal: Soft  Bowel sounds are normal    Musculoskeletal: Normal range of motion  Neurological: She is alert and oriented to person, place, and time  Skin: Skin is warm  Psychiatric: She has a normal mood and affect   Her behavior is normal

## 2018-11-14 ENCOUNTER — OFFICE VISIT (OUTPATIENT)
Dept: FAMILY MEDICINE CLINIC | Facility: CLINIC | Age: 53
End: 2018-11-14
Payer: COMMERCIAL

## 2018-11-14 VITALS
RESPIRATION RATE: 18 BRPM | DIASTOLIC BLOOD PRESSURE: 64 MMHG | OXYGEN SATURATION: 98 % | HEART RATE: 61 BPM | TEMPERATURE: 98 F | BODY MASS INDEX: 18.29 KG/M2 | SYSTOLIC BLOOD PRESSURE: 102 MMHG | WEIGHT: 100 LBS

## 2018-11-14 DIAGNOSIS — R21 RASH OF GENITAL AREA: ICD-10-CM

## 2018-11-14 DIAGNOSIS — N89.8 VAGINAL DISCHARGE: Primary | ICD-10-CM

## 2018-11-14 PROCEDURE — 99213 OFFICE O/P EST LOW 20 MIN: CPT | Performed by: FAMILY MEDICINE

## 2018-11-15 NOTE — PROGRESS NOTES
Assessment/Plan:    No problem-specific Assessment & Plan notes found for this encounter  Diagnoses and all orders for this visit:    Vaginal discharge  -     Genital Comprehensive Culture   Likely secondary from normal vaginal discharge  Given the fact that is watery will send cultures  Rash of genital area    likely secondary to razor burn  Advised to shave in direction of hair growth, apply moisturizer and warm compresses  Subjective:      Patient ID: Lisseth Ya is a 47 y o  female  69-year-old female presents with multiple complaints including vaginal discharge in rash in genital area  She states that she has slightly noticed a rash for the past 1 day but got concerned and decided to come into the office  She has noticed some white/clear discharge and does not know if that is normal   She is sexually active currently with 1 partner  She does have a history of herpes in her 25s  She denies any vaginal bleeding, urgency, or frequency  The following portions of the patient's history were reviewed and updated as appropriate: allergies, current medications, past family history, past medical history, past social history, past surgical history and problem list     Review of Systems   Constitutional: Negative for fever  HENT: Negative for sore throat  Respiratory: Negative for cough and shortness of breath  Cardiovascular: Negative for chest pain  Gastrointestinal: Negative for abdominal pain, constipation, nausea and vomiting  Genitourinary: Positive for vaginal discharge  Negative for dysuria, flank pain, frequency, pelvic pain and vaginal bleeding  Musculoskeletal: Negative for back pain  Skin: Positive for rash  Neurological: Negative for dizziness, weakness, light-headedness and headaches  Psychiatric/Behavioral: Negative for agitation           Objective:      /64 (BP Location: Left arm, Patient Position: Sitting, Cuff Size: Standard)   Pulse 61   Temp 98 °F (36 7 °C) (Tympanic)   Resp 18   Wt 45 4 kg (100 lb)   SpO2 98%   BMI 18 29 kg/m²          Physical Exam   Constitutional: She is oriented to person, place, and time  She appears well-developed and well-nourished  No distress  HENT:   Head: Normocephalic and atraumatic  Nose: Nose normal    Mouth/Throat: Oropharynx is clear and moist    Eyes: EOM are normal  Right eye exhibits no discharge  Left eye exhibits no discharge  Cardiovascular: Normal rate, regular rhythm, normal heart sounds and intact distal pulses  No murmur heard  Pulmonary/Chest: Effort normal and breath sounds normal  No respiratory distress  Abdominal: Soft  Bowel sounds are normal  She exhibits no distension  There is no tenderness  Genitourinary:   Genitourinary Comments: Clear/white discharge that is watery       Musculoskeletal: Normal range of motion  She exhibits no edema  Neurological: She is alert and oriented to person, place, and time  Skin: Skin is warm  Multiple razor burn rash in the groin   Psychiatric: She has a normal mood and affect   Her behavior is normal

## 2018-11-17 LAB
BACTERIA GENITAL AEROBE CULT: NORMAL
Lab: NORMAL

## 2019-01-26 DIAGNOSIS — L70.9 ACNE, UNSPECIFIED ACNE TYPE: Primary | ICD-10-CM

## 2019-01-28 RX ORDER — DOXYCYCLINE HYCLATE 100 MG/1
CAPSULE ORAL
Qty: 60 CAPSULE | Refills: 5 | Status: SHIPPED | OUTPATIENT
Start: 2019-01-28 | End: 2019-02-11 | Stop reason: ALTCHOICE

## 2019-02-11 ENCOUNTER — OFFICE VISIT (OUTPATIENT)
Dept: FAMILY MEDICINE CLINIC | Facility: CLINIC | Age: 54
End: 2019-02-11
Payer: COMMERCIAL

## 2019-02-11 VITALS
OXYGEN SATURATION: 98 % | HEART RATE: 94 BPM | DIASTOLIC BLOOD PRESSURE: 60 MMHG | SYSTOLIC BLOOD PRESSURE: 92 MMHG | WEIGHT: 101 LBS | BODY MASS INDEX: 18.47 KG/M2 | RESPIRATION RATE: 18 BRPM

## 2019-02-11 DIAGNOSIS — L72.9 SUBCUTANEOUS CYST: Primary | ICD-10-CM

## 2019-02-11 PROCEDURE — 99213 OFFICE O/P EST LOW 20 MIN: CPT | Performed by: NURSE PRACTITIONER

## 2019-02-11 NOTE — PROGRESS NOTES
Assessment/Plan:  1  Apply warm compresses a couple times a day to the area  2  Do not squeeze the lesion  3  Follow up if condition changes or worsens         Diagnoses and all orders for this visit:    Subcutaneous cyst          Subjective:      Patient ID: Brianna Gallegos is a 47 y o  female  A 52-year-old female presents with a lesion under her left eye for about 2 weeks  Reports it is getting a little bit better however has not resolved  Has been using benzol peroxide  Not helping  Denies injury  Patient reports she squeezed the area couple of times  Reports that may have made it worse  The following portions of the patient's history were reviewed and updated as appropriate: allergies and current medications  Review of Systems   Constitutional: Negative  Skin:        lesion         Objective:      BP 92/60   Pulse 94   Resp 18   Wt 45 8 kg (101 lb)   SpO2 98%   BMI 18 47 kg/m²          Physical Exam   Constitutional: She appears well-developed and well-nourished     Skin:   0 25cm resolving cyst/non tender/soft left cheek

## 2019-02-13 ENCOUNTER — OFFICE VISIT (OUTPATIENT)
Dept: URGENT CARE | Facility: CLINIC | Age: 54
End: 2019-02-13
Payer: COMMERCIAL

## 2019-02-13 VITALS
DIASTOLIC BLOOD PRESSURE: 70 MMHG | SYSTOLIC BLOOD PRESSURE: 100 MMHG | BODY MASS INDEX: 18.04 KG/M2 | RESPIRATION RATE: 16 BRPM | WEIGHT: 101.8 LBS | HEIGHT: 63 IN | HEART RATE: 71 BPM | TEMPERATURE: 98 F | OXYGEN SATURATION: 97 %

## 2019-02-13 DIAGNOSIS — R68.89 FLU-LIKE SYMPTOMS: Primary | ICD-10-CM

## 2019-02-13 PROCEDURE — 99213 OFFICE O/P EST LOW 20 MIN: CPT | Performed by: PHYSICIAN ASSISTANT

## 2019-02-13 NOTE — PATIENT INSTRUCTIONS
Continue acetaminophen (Tylenol) or ibuprofen (Motrin) may be taken for fever and discomfort  Check your package labeling as some decongestants already contain these medications  Saltwater gargles, warm tea with honey, and throat lozenges may be relieving of throat discomfort  Use a cool mist humidifier at bedtime, turning on hours prior to bed with your bedroom doors shut for maximum relief  Follow up with your family doctor in 3-5 days  Proceed to the ER if symptoms worsen

## 2019-02-13 NOTE — LETTER
February 13, 2019     Patient: James Bates   YOB: 1965   Date of Visit: 2/13/2019       To Whom It May Concern: It is my medical opinion that James Bates may return to work when fever free for 24 hours without the use of fever reducing medication       If you have any questions or concerns, please don't hesitate to call           Sincerely,        Andrés Berry PA-C

## 2019-02-13 NOTE — PROGRESS NOTES
[id number]        NAME: Ricki Hollis is a 47 y o  female  : 1965    MRN: [id number]  DATE: 2019  TIME: 2:12 PM    Assessment and Plan   Flu-like symptoms [R68 89]  1  Flu-like symptoms       Patient Instructions   Continue acetaminophen (Tylenol) or ibuprofen (Motrin) may be taken for fever and discomfort  Check your package labeling as some decongestants already contain these medications  Saltwater gargles, warm tea with honey, and throat lozenges may be relieving of throat discomfort  Use a cool mist humidifier at bedtime, turning on hours prior to bed with your bedroom doors shut for maximum relief  Follow up with your family doctor in 3-5 days  Proceed to the ER if symptoms worsen  Discussed benefits and risks of initiating Tamiflu therapy  Side effects of this medication were reviewed including potential psychological side effects  Notified that the flu can be a clinical diagnosis, however, a confirmatory flu swab is available  Discussed the potential out-of-pocket cost for the flu swab  The patient made an educated decision to treat conservatively and to not have the flu swab performed  The diagnosis, etiology, expected course of illness, and treatment plan were reviewed  All questions answered  Precautions given  Patient verbalized understanding and agreement the treatment plan  Chief Complaint   No chief complaint on file  History of Present Illness   46 y/o female presenitng with c/o feeling horrible x 2 days  Sx include headache, lightheadedness, muscle aches, fatigue, feeling feverish, chills, sweats, sore throat, central upper stomach tenderness to touch-- "feels bruised", and an occasional dry cough  She has attempted treating acetaminophen, last taken at 0800, which is somewhat relieving of discomfort  She reportedly works at [email or web address] with multiple sick contacts at work  No recent travel  Smoker of 1/2 ppd  Had flu shot         Review of Systems Review of Systems   HENT: Negative for congestion, ear pain, postnasal drip and rhinorrhea  Respiratory: Negative for shortness of breath and wheezing  Cardiovascular: Negative for chest pain and palpitations  Gastrointestinal: Negative for abdominal distention, constipation, diarrhea, nausea and vomiting  Skin: Negative for rash  Neurological: Negative for dizziness and light-headedness  Current Medications       Current Outpatient Medications:     citalopram (CeleXA) 10 mg tablet, Take 1 tablet (10 mg total) by mouth daily, Disp: 30 tablet, Rfl: 5    clonazePAM (KlonoPIN) 0 5 mg tablet, Take 1 tablet (0 5 mg total) by mouth daily, Disp: 60 tablet, Rfl: 5    FINACEA 15 % cream, , Disp: , Rfl: 0    Current Allergies     Allergies as of 02/13/2019    (No Known Allergies)            The following portions of the patient's history were reviewed and updated as appropriate: allergies, current medications, past family history, past medical history, past social history, past surgical history and problem list      History reviewed  No pertinent past medical history  History reviewed  No pertinent surgical history  Family History   Problem Relation Age of Onset    Diabetes Mother     Hyperlipidemia Father      Medications have been verified  Objective   /70   Pulse 71   Temp 98 °F (36 7 °C)   Resp 16   Ht 5' 2 5" (1 588 m)   Wt 46 2 kg (101 lb 12 8 oz)   SpO2 97%   BMI 18 32 kg/m²      Physical Exam     Physical Exam   Constitutional: She is oriented to person, place, and time  Vital signs are normal  She appears well-developed and well-nourished  She does not appear ill  No distress  HENT:   Head: Normocephalic and atraumatic  Right Ear: Hearing, tympanic membrane, external ear and ear canal normal    Left Ear: Hearing, tympanic membrane, external ear and ear canal normal    Nose: Nose normal  No mucosal edema or rhinorrhea     Mouth/Throat: Uvula is midline, oropharynx is clear and moist and mucous membranes are normal  Mucous membranes are not pale, not dry and not cyanotic  No oropharyngeal exudate, posterior oropharyngeal edema or tonsillar abscesses  Tonsils are 1+ on the right  Tonsils are 1+ on the left  No tonsillar exudate  Mild posterior oropharyngeal mucosa  Eyes: Conjunctivae are normal  Right eye exhibits no discharge  Left eye exhibits no discharge  No scleral icterus  Neck: Trachea normal and phonation normal  Neck supple  No neck rigidity  No edema and no erythema present  Cardiovascular: Normal rate, regular rhythm and normal heart sounds  Exam reveals no gallop and no friction rub  No murmur heard  Pulmonary/Chest: Effort normal and breath sounds normal  No respiratory distress  She has no decreased breath sounds  She has no wheezes  She has no rhonchi  She has no rales  Abdominal: Soft  Bowel sounds are normal  She exhibits no distension and no mass  There is no tenderness  There is no rebound and no guarding  Lymphadenopathy:     She has no cervical adenopathy  Neurological: She is alert and oriented to person, place, and time  She is not disoriented  No cranial nerve deficit  She exhibits normal muscle tone  Coordination normal    Skin: Skin is warm and dry  No rash noted  She is not diaphoretic  No erythema  No pallor  Psychiatric: She has a normal mood and affect  Her behavior is normal    Nursing note and vitals reviewed

## 2019-02-14 ENCOUNTER — APPOINTMENT (EMERGENCY)
Dept: RADIOLOGY | Facility: HOSPITAL | Age: 54
End: 2019-02-14
Payer: COMMERCIAL

## 2019-02-14 ENCOUNTER — HOSPITAL ENCOUNTER (EMERGENCY)
Facility: HOSPITAL | Age: 54
Discharge: HOME | End: 2019-02-14
Attending: EMERGENCY MEDICINE | Admitting: EMERGENCY MEDICINE
Payer: COMMERCIAL

## 2019-02-14 VITALS
SYSTOLIC BLOOD PRESSURE: 101 MMHG | DIASTOLIC BLOOD PRESSURE: 63 MMHG | OXYGEN SATURATION: 100 % | TEMPERATURE: 99.6 F | RESPIRATION RATE: 18 BRPM | HEART RATE: 63 BPM

## 2019-02-14 DIAGNOSIS — J11.1 INFLUENZA-LIKE ILLNESS: Primary | ICD-10-CM

## 2019-02-14 LAB
FLUAV AG SPEC QL IA: NEGATIVE
FLUAV AG SPEC QL: NOT DETECTED
FLUBV AG SPEC QL IA: NEGATIVE
FLUBV AG SPEC QL: NOT DETECTED
RSV B RNA SPEC QL NAA+PROBE: NOT DETECTED

## 2019-02-14 PROCEDURE — 87631 RESP VIRUS 3-5 TARGETS: CPT | Performed by: PHYSICIAN ASSISTANT

## 2019-02-14 PROCEDURE — 71046 X-RAY EXAM CHEST 2 VIEWS: CPT

## 2019-02-14 PROCEDURE — 94640 AIRWAY INHALATION TREATMENT: CPT

## 2019-02-14 PROCEDURE — 99283 EMERGENCY DEPT VISIT LOW MDM: CPT

## 2019-02-14 RX ORDER — IPRATROPIUM BROMIDE AND ALBUTEROL SULFATE 2.5; .5 MG/3ML; MG/3ML
3 SOLUTION RESPIRATORY (INHALATION) ONCE
Status: COMPLETED | OUTPATIENT
Start: 2019-02-14 | End: 2019-02-14

## 2019-02-14 RX ORDER — PREDNISONE 20 MG/1
20 TABLET ORAL ONCE
Status: DISCONTINUED | OUTPATIENT
Start: 2019-02-14 | End: 2019-02-14 | Stop reason: HOSPADM

## 2019-02-14 RX ORDER — OSELTAMIVIR PHOSPHATE 75 MG/1
75 CAPSULE ORAL EVERY 12 HOURS
Qty: 10 CAPSULE | Refills: 0 | Status: SHIPPED | OUTPATIENT
Start: 2019-02-14 | End: 2019-02-19

## 2019-02-14 RX ADMIN — IPRATROPIUM BROMIDE AND ALBUTEROL SULFATE 3 ML: 2.5; .5 SOLUTION RESPIRATORY (INHALATION) at 10:38

## 2019-02-14 NOTE — ED PROVIDER NOTES
History  Chief Complaint   Patient presents with    Fever - 9 weeks to 74 years     seen at [facility] yesterday and told has flu  headache, fever, body aches since monday     52 year old female presents with flu like symptoms x2 days  She was seen by urgent care who diagnosed her with influenza like illness  She is here today because she has increased chest congestion, body aches, as well as chills  She is a daily smoker  She has not tried anything for the aches or pains  She received flu shot this year  She has never had the flu before so she states she is unaware of symptoms involved  She denies any sore throat, cough, chest pain, shortness of breath, difficulty breathing, abdominal pain, nausea, constipation, headache, dizziness, lightheadedness, or weakness  Prior to Admission Medications   Prescriptions Last Dose Informant Patient Reported? Taking? FINACEA 15 % cream   Yes No   citalopram (CeleXA) 10 mg tablet   No No   Sig: Take 1 tablet (10 mg total) by mouth daily   clonazePAM (KlonoPIN) 0 5 mg tablet   No No   Sig: Take 1 tablet (0 5 mg total) by mouth daily      Facility-Administered Medications: None       Past Medical History:   Diagnosis Date    Lyme disease        No past surgical history on file  Family History   Problem Relation Age of Onset    Diabetes Mother     Hyperlipidemia Father      I have reviewed and agree with the history as documented  Social History     Tobacco Use    Smoking status: Current Every Day Smoker     Packs/day: 0 50    Smokeless tobacco: Never Used   Substance Use Topics    Alcohol use: No     Comment: social alcohol use as per allscripts    Drug use: No        Review of Systems   Constitutional: Positive for chills and fever  HENT: Negative for sneezing and sore throat  Respiratory: Positive for cough  Negative for shortness of breath  Cardiovascular: Negative for chest pain, palpitations and leg swelling     Gastrointestinal: Negative for abdominal pain, constipation, diarrhea, nausea and vomiting  Musculoskeletal: Negative for back pain, gait problem and joint swelling  Skin: Negative for color change, pallor, rash and wound  Neurological: Negative for dizziness, syncope, weakness, light-headedness, numbness and headaches  All other systems reviewed and are negative  Physical Exam  Physical Exam   Constitutional: She appears well-developed and well-nourished  No distress  HENT:   Head: Normocephalic and atraumatic  Right Ear: Hearing, tympanic membrane, external ear and ear canal normal  Tympanic membrane is not perforated, not erythematous, not retracted and not bulging  Left Ear: Hearing, tympanic membrane, external ear and ear canal normal  Tympanic membrane is not perforated, not erythematous, not retracted and not bulging  Nose: Nose normal    Mouth/Throat: Uvula is midline, oropharynx is clear and moist and mucous membranes are normal  No trismus in the jaw  No uvula swelling  No oropharyngeal exudate, posterior oropharyngeal edema, posterior oropharyngeal erythema or tonsillar abscesses  Eyes: EOM are normal    Neck: Normal range of motion  Cardiovascular: Normal rate, regular rhythm, normal heart sounds and intact distal pulses  Exam reveals no gallop and no friction rub  No murmur heard  Pulmonary/Chest: Effort normal and breath sounds normal  No stridor  No respiratory distress  She has no wheezes  She has no rales  Sp02 is 100% indicating adequate oxygenation on room air   Skin: Skin is warm  Capillary refill takes less than 2 seconds  No rash noted  She is not diaphoretic  No erythema  No pallor  Nursing note and vitals reviewed        Vital Signs  ED Triage Vitals [02/14/19 1011]   Temperature Pulse Respirations Blood Pressure SpO2   99 6 °F (37 6 °C) 63 18 101/63 100 %      Temp Source Heart Rate Source Patient Position - Orthostatic VS BP Location FiO2 (%)   Tympanic Monitor Sitting Right arm -- Pain Score       --           Vitals:    02/14/19 1011   BP: 101/63   Pulse: 63   Patient Position - Orthostatic VS: Sitting       Visual Acuity      ED Medications  Medications   predniSONE tablet 20 mg (20 mg Oral Not Given 2/14/19 1040)   ipratropium-albuterol (DUO-NEB) 0 5-2 5 mg/3 mL inhalation solution 3 mL (3 mL Nebulization Given 2/14/19 1038)       Diagnostic Studies  Results Reviewed     Procedure Component Value Units Date/Time    Rapid Influenza Screen with Reflex PCR [[id number]]  (Normal) Collected:  02/14/19 1040    Lab Status:  Final result Specimen:  Nasopharyngeal Swab Updated:  02/14/19 1123     Rapid Influenza A Ag Negative     Rapid Influenza B Ag Negative    INFLUENZA A/B AND RSV, PCR [[id number]] Collected:  02/14/19 1040    Lab Status: In process Specimen:  Nasopharyngeal Swab Updated:  02/14/19 1123                 XR chest 2 views   Final Result by German Stanton MD (02/14 1300)      No acute cardiopulmonary disease  Workstation performed: [id number]                    Procedures  Procedures       Phone Contacts  ED Phone Contact    ED Course                               MDM  Number of Diagnoses or Management Options  Diagnosis management comments: Patient well appearing, afebrile, in no acute distress  Explained s/s of flu, advised bed rest, increased hydration, and given tamiflu given short duration of symptoms  Gave patient proper education regarding diagnosis  Answered all questions  Return to ED for any worsening of symptoms otherwise follow up with primary care physician for re-evaluation  Discussed plan with patient who verbalized understanding and agreed to plan         Amount and/or Complexity of Data Reviewed  Tests in the radiology section of CPT®: ordered and reviewed  Discussion of test results with the performing providers: yes  Review and summarize past medical records: yes  Discuss the patient with other providers: yes  Independent visualization of images, tracings, or specimens: yes        Disposition  Final diagnoses:   Influenza-like illness     Time reflects when diagnosis was documented in both MDM as applicable and the Disposition within this note     Time User Action Codes Description Comment    2/14/2019 12:58 PM Sheri Rodriguez [address] Influenza-like illness       ED Disposition     ED Disposition Condition Date/Time Comment    Discharge Good Thu Feb 14, 2019 12:58 PM Shanti Kearney discharge to home/self care  Follow-up Information     Follow up With Specialties Details Why Contact Info Additional Information    [facility] Emergency Department Emergency Medicine Go to  As needed [address] [facility] [address]  [phone] [facility] ED, [address], [place], [address]          Discharge Medication List as of 2/14/2019 12:59 PM      START taking these medications    Details   oseltamivir (TAMIFLU) 75 mg capsule Take 1 capsule (75 mg total) by mouth every 12 (twelve) hours for 5 days, Starting Thu 2/14/2019, Until Tue 2/19/2019, Print         CONTINUE these medications which have NOT CHANGED    Details   citalopram (CeleXA) 10 mg tablet Take 1 tablet (10 mg total) by mouth daily, Starting Thu 10/11/2018, Normal      clonazePAM (KlonoPIN) 0 5 mg tablet Take 1 tablet (0 5 mg total) by mouth daily, Starting Mon 7/30/2018, Phone In      "[employer] 15 % cream Starting Tue 7/3/2018, Historical Med           No discharge procedures on file      ED Provider  Electronically Signed by           Beverly Cleary PA-C  02/14/19 8714

## 2019-02-15 ENCOUNTER — TELEPHONE (OUTPATIENT)
Dept: FAMILY MEDICINE CLINIC | Facility: CLINIC | Age: 54
End: 2019-02-15

## 2019-02-15 NOTE — TELEPHONE ENCOUNTER
She's going to have to call her insurance and find a derm that can see her sooner  The lesion is resolving

## 2019-02-15 NOTE — TELEPHONE ENCOUNTER
JACLYN - PT SEEN EARLIER THIS WEEK WITH A CYST [facility]ER [facility]  SHE CAN'T GET AN APPT WITH DERM FOR A LONG TIME  SHE WANTS YOU TO CALL THEM AND HAVE THEM MAKE AN APPT FOR HER RIGHT AWAY  [address] [place] [address] [phone]  SHE WOULD LIKE CALL BACK

## 2019-03-18 ENCOUNTER — OFFICE VISIT (OUTPATIENT)
Dept: FAMILY MEDICINE CLINIC | Facility: CLINIC | Age: 54
End: 2019-03-18
Payer: COMMERCIAL

## 2019-03-18 VITALS
BODY MASS INDEX: 17.89 KG/M2 | HEIGHT: 63 IN | HEART RATE: 72 BPM | SYSTOLIC BLOOD PRESSURE: 110 MMHG | WEIGHT: 101 LBS | DIASTOLIC BLOOD PRESSURE: 72 MMHG

## 2019-03-18 DIAGNOSIS — N95.1 VAGINAL DRYNESS, MENOPAUSAL: ICD-10-CM

## 2019-03-18 DIAGNOSIS — F41.9 ANXIETY: ICD-10-CM

## 2019-03-18 DIAGNOSIS — N89.8 VAGINAL ODOR: ICD-10-CM

## 2019-03-18 DIAGNOSIS — Z87.42 HISTORY OF ABNORMAL CERVICAL PAP SMEAR: Primary | ICD-10-CM

## 2019-03-18 DIAGNOSIS — B97.7 HPV IN FEMALE: ICD-10-CM

## 2019-03-18 LAB
SL AMB  POCT GLUCOSE, UA: NORMAL
SL AMB LEUKOCYTE ESTERASE,UA: 25
SL AMB POCT BILIRUBIN,UA: ABNORMAL
SL AMB POCT BLOOD,UA: ABNORMAL
SL AMB POCT CLARITY,UA: CLEAR
SL AMB POCT COLOR,UA: YELLOW
SL AMB POCT KETONES,UA: 15
SL AMB POCT NITRITE,UA: ABNORMAL
SL AMB POCT PH,UA: 6.5
SL AMB POCT SPECIFIC GRAVITY,UA: 1.01
SL AMB POCT URINE PROTEIN: ABNORMAL
SL AMB POCT UROBILINOGEN: 1

## 2019-03-18 PROCEDURE — 81002 URINALYSIS NONAUTO W/O SCOPE: CPT | Performed by: FAMILY MEDICINE

## 2019-03-18 PROCEDURE — 3008F BODY MASS INDEX DOCD: CPT | Performed by: FAMILY MEDICINE

## 2019-03-18 PROCEDURE — 99214 OFFICE O/P EST MOD 30 MIN: CPT | Performed by: FAMILY MEDICINE

## 2019-03-18 RX ORDER — CITALOPRAM 10 MG/1
10 TABLET ORAL DAILY
Qty: 30 TABLET | Refills: 5 | Status: SHIPPED | OUTPATIENT
Start: 2019-03-18 | End: 2019-04-22 | Stop reason: SDUPTHER

## 2019-03-18 NOTE — PROGRESS NOTES
Assessment/Plan:     Problem List Items Addressed This Visit        Genitourinary    Vaginal dryness, menopausal     · Patient states she has underwent menopause this year  Vaginal dryness may be secondary to decreased estrogen  Patient is HPV 18 positive  Advised to follow up with gynecologist in regards to history of abnormal Pap smear prior to beginning any topical estrogen cream if indicated  Other    Anxiety     · Patient advised to return for continued follow-up for anxiety and associated optimization of management  Patient currently denies any current suicidal ideation  Relevant Medications    citalopram (CeleXA) 10 mg tablet    Vaginal odor     · Patient reports vaginal order, consult 3 days ago  Follow up on results chlamydia/GC amplified DNA by PCR and genital comprehensive culture  · Urine dip + leukocytes, -nitrite           Other Visit Diagnoses     History of abnormal cervical Pap smear    -  Primary    Relevant Orders    Ambulatory referral to Gynecology    HPV in female        Relevant Orders    Ambulatory referral to Gynecology            Subjective:      Patient ID: Thomas Youssef is a 47 y o  female  HPI    The following portions of the patient's history were reviewed and updated as appropriate: current medications, past medical history, past social history, past surgical history and problem list     64-year-old female past medical history of anxiety and Lyme disease presents for acute visit  Patient was seen at [address] [place] [address] on February 13, 2019 for flu-like symptoms, resolved  Patient complains of vaginal pruritus and odor  Onset 3 days ago  Vaginal symptoms: odor, vulvar itching and warts (Aldara cream prescribed last year)  Patient states she has a history of HPV  Patient states she has had a colposcopy in the past when in 35s  Patient denies any dyspareunia or postcoital bleeding  Contraception: post menopausal status   She denies discharge, dyspareunia, post coital bleeding,urinary symptoms, chills, cloudy urine, dysuria, flank pain bilaterally and hematuria  Sexually transmitted infection risk: possible STD exposure (partner has had relations with other women of unknown STI history) and herpes simplex virus  Menstrual flow:  Patient states she has underwent menopause this year  Recent Pap smear negative for intraepithelial lesion or malignancy, HPV 16 negative, and HPV 18 positive  Review of Systems      As noted in HPI     Objective:      /72   Pulse 72   Ht 5' 2 5" (1 588 m)   Wt 45 8 kg (101 lb)   BMI 18 18 kg/m²       Current Outpatient Medications:     citalopram (CeleXA) 10 mg tablet, Take 1 tablet (10 mg total) by mouth daily, Disp: 30 tablet, Rfl: 5    clonazePAM (KlonoPIN) 0 5 mg tablet, Take 1 tablet (0 5 mg total) by mouth daily, Disp: 60 tablet, Rfl: 5    FINACEA 15 % cream, , Disp: , Rfl: 0       Physical Exam   Constitutional: She is oriented to person, place, and time  She appears well-developed  underweight     HENT:   Head: Normocephalic and atraumatic  Right Ear: External ear normal    Left Ear: External ear normal    Nose: Nose normal    Mouth/Throat: Oropharynx is clear and moist    Eyes: Pupils are equal, round, and reactive to light  Conjunctivae and EOM are normal    Neck: Normal range of motion  Neck supple  Cardiovascular: Normal rate, regular rhythm, normal heart sounds and intact distal pulses  No murmur heard  Pulmonary/Chest: Effort normal and breath sounds normal    Abdominal: Soft  Bowel sounds are normal  There is no tenderness  Genitourinary: Vagina normal and uterus normal  No vaginal discharge found  Musculoskeletal: Normal range of motion  She exhibits no edema  Lymphadenopathy:     She has no cervical adenopathy  Neurological: She is alert and oriented to person, place, and time  She has normal reflexes  Skin: Skin is warm  Capillary refill takes less than 2 seconds   No rash noted    scarring lower extremities (excoriations)   Psychiatric: Her mood appears anxious  Thought content is paranoid

## 2019-03-18 NOTE — ASSESSMENT & PLAN NOTE
· Patient reports vaginal order, consult 3 days ago  Follow up on results chlamydia/GC amplified DNA by PCR and genital comprehensive culture    · Urine dip + leukocytes, -nitrite

## 2019-03-18 NOTE — ASSESSMENT & PLAN NOTE
· Patient states she has underwent menopause this year  Vaginal dryness may be secondary to decreased estrogen  Patient is HPV 18 positive  Advised to follow up with gynecologist in regards to history of abnormal Pap smear prior to beginning any topical estrogen cream if indicated

## 2019-03-19 LAB
C TRACH RRNA SPEC QL NAA+PROBE: NEGATIVE
N GONORRHOEA RRNA SPEC QL NAA+PROBE: NEGATIVE

## 2019-03-22 DIAGNOSIS — F41.9 ANXIETY: ICD-10-CM

## 2019-03-22 LAB
BACTERIA GENITAL AEROBE CULT: ABNORMAL
Lab: ABNORMAL
Lab: ABNORMAL

## 2019-03-25 DIAGNOSIS — A49.1 GBS (GROUP B STREPTOCOCCUS) INFECTION: Primary | ICD-10-CM

## 2019-03-25 DIAGNOSIS — F41.9 ANXIETY: ICD-10-CM

## 2019-03-25 RX ORDER — AMOXICILLIN 500 MG/1
500 CAPSULE ORAL EVERY 8 HOURS SCHEDULED
Qty: 21 CAPSULE | Refills: 0 | Status: SHIPPED | OUTPATIENT
Start: 2019-03-25 | End: 2019-04-01

## 2019-04-01 DIAGNOSIS — F41.9 ANXIETY: ICD-10-CM

## 2019-04-01 RX ORDER — CLONAZEPAM 0.5 MG/1
TABLET ORAL
Qty: 60 TABLET | Refills: 2 | Status: SHIPPED | OUTPATIENT
Start: 2019-04-01 | End: 2019-07-19 | Stop reason: SDUPTHER

## 2019-04-04 RX ORDER — CLONAZEPAM 0.5 MG/1
TABLET ORAL
Qty: 60 TABLET | Refills: 2 | OUTPATIENT
Start: 2019-04-04

## 2019-04-22 ENCOUNTER — OFFICE VISIT (OUTPATIENT)
Dept: FAMILY MEDICINE CLINIC | Facility: CLINIC | Age: 54
End: 2019-04-22
Payer: COMMERCIAL

## 2019-04-22 VITALS
OXYGEN SATURATION: 99 % | WEIGHT: 100 LBS | BODY MASS INDEX: 19.63 KG/M2 | DIASTOLIC BLOOD PRESSURE: 60 MMHG | RESPIRATION RATE: 20 BRPM | HEIGHT: 60 IN | TEMPERATURE: 97.7 F | SYSTOLIC BLOOD PRESSURE: 96 MMHG | HEART RATE: 72 BPM

## 2019-04-22 DIAGNOSIS — Z51.81 ENCOUNTER FOR MEDICATION MONITORING: ICD-10-CM

## 2019-04-22 DIAGNOSIS — F32.A DEPRESSION, UNSPECIFIED DEPRESSION TYPE: ICD-10-CM

## 2019-04-22 DIAGNOSIS — R46.81 OBSESSIVE-COMPULSIVE BEHAVIOR: ICD-10-CM

## 2019-04-22 DIAGNOSIS — F41.9 ANXIETY: ICD-10-CM

## 2019-04-22 DIAGNOSIS — Z71.6 ENCOUNTER FOR SMOKING CESSATION COUNSELING: ICD-10-CM

## 2019-04-22 DIAGNOSIS — R53.83 FATIGUE, UNSPECIFIED TYPE: Primary | ICD-10-CM

## 2019-04-22 PROCEDURE — 99214 OFFICE O/P EST MOD 30 MIN: CPT | Performed by: FAMILY MEDICINE

## 2019-04-22 RX ORDER — CITALOPRAM 10 MG/1
10 TABLET ORAL DAILY
Qty: 30 TABLET | Refills: 5 | Status: SHIPPED | OUTPATIENT
Start: 2019-04-22 | End: 2019-10-18 | Stop reason: SDUPTHER

## 2019-04-23 LAB
ALBUMIN SERPL-MCNC: 4.2 G/DL (ref 3.5–5.5)
ALBUMIN/GLOB SERPL: 1.8 {RATIO} (ref 1.2–2.2)
ALP SERPL-CCNC: 47 IU/L (ref 39–117)
ALT SERPL-CCNC: 11 IU/L (ref 0–32)
AST SERPL-CCNC: 15 IU/L (ref 0–40)
BILIRUB SERPL-MCNC: 0.3 MG/DL (ref 0–1.2)
BUN SERPL-MCNC: 13 MG/DL (ref 6–24)
BUN/CREAT SERPL: 15 (ref 9–23)
CALCIUM SERPL-MCNC: 9.4 MG/DL (ref 8.7–10.2)
CHLORIDE SERPL-SCNC: 102 MMOL/L (ref 96–106)
CO2 SERPL-SCNC: 24 MMOL/L (ref 20–29)
CREAT SERPL-MCNC: 0.86 MG/DL (ref 0.57–1)
GLOBULIN SER-MCNC: 2.3 G/DL (ref 1.5–4.5)
GLUCOSE SERPL-MCNC: 93 MG/DL (ref 65–99)
POTASSIUM SERPL-SCNC: 4.2 MMOL/L (ref 3.5–5.2)
PROT SERPL-MCNC: 6.5 G/DL (ref 6–8.5)
SL AMB EGFR AFRICAN AMERICAN: 90 ML/MIN/1.73
SL AMB EGFR NON AFRICAN AMERICAN: 78 ML/MIN/1.73
SODIUM SERPL-SCNC: 139 MMOL/L (ref 134–144)

## 2019-04-24 ENCOUNTER — TELEPHONE (OUTPATIENT)
Dept: FAMILY MEDICINE CLINIC | Facility: CLINIC | Age: 54
End: 2019-04-24

## 2019-04-24 LAB
25(OH)D3+25(OH)D2 SERPL-MCNC: 25.3 NG/ML (ref 30–100)
BASOPHILS # BLD AUTO: 0 X10E3/UL (ref 0–0.2)
BASOPHILS NFR BLD AUTO: 1 %
EOSINOPHIL # BLD AUTO: 0.1 X10E3/UL (ref 0–0.4)
EOSINOPHIL NFR BLD AUTO: 2 %
ERYTHROCYTE [DISTWIDTH] IN BLOOD BY AUTOMATED COUNT: 13.6 % (ref 12.3–15.4)
HCT VFR BLD AUTO: 38.6 % (ref 34–46.6)
HGB BLD-MCNC: 13.1 G/DL (ref 11.1–15.9)
IMM GRANULOCYTES # BLD: 0 X10E3/UL (ref 0–0.1)
IMM GRANULOCYTES NFR BLD: 0 %
LYMPHOCYTES # BLD AUTO: 2.3 X10E3/UL (ref 0.7–3.1)
LYMPHOCYTES NFR BLD AUTO: 35 %
MCH RBC QN AUTO: 31.3 PG (ref 26.6–33)
MCHC RBC AUTO-ENTMCNC: 33.9 G/DL (ref 31.5–35.7)
MCV RBC AUTO: 92 FL (ref 79–97)
MONOCYTES # BLD AUTO: 0.5 X10E3/UL (ref 0.1–0.9)
MONOCYTES NFR BLD AUTO: 7 %
NEUTROPHILS # BLD AUTO: 3.7 X10E3/UL (ref 1.4–7)
NEUTROPHILS NFR BLD AUTO: 55 %
PLATELET # BLD AUTO: 192 X10E3/UL (ref 150–379)
RBC # BLD AUTO: 4.18 X10E6/UL (ref 3.77–5.28)
TSH SERPL DL<=0.005 MIU/L-ACNC: 0.46 UIU/ML (ref 0.45–4.5)
WBC # BLD AUTO: 6.6 X10E3/UL (ref 3.4–10.8)

## 2019-04-29 ENCOUNTER — TRANSCRIBE ORDERS (OUTPATIENT)
Dept: ADMINISTRATIVE | Facility: HOSPITAL | Age: 54
End: 2019-04-29

## 2019-04-29 DIAGNOSIS — Z12.39 BREAST SCREENING, UNSPECIFIED: Primary | ICD-10-CM

## 2019-06-03 ENCOUNTER — HOSPITAL ENCOUNTER (OUTPATIENT)
Dept: RADIOLOGY | Facility: HOSPITAL | Age: 54
Discharge: HOME | End: 2019-06-03
Payer: COMMERCIAL

## 2019-06-03 ENCOUNTER — OFFICE VISIT (OUTPATIENT)
Dept: FAMILY MEDICINE CLINIC | Facility: CLINIC | Age: 54
End: 2019-06-03
Payer: COMMERCIAL

## 2019-06-03 VITALS
RESPIRATION RATE: 20 BRPM | WEIGHT: 105 LBS | OXYGEN SATURATION: 97 % | BODY MASS INDEX: 20.62 KG/M2 | TEMPERATURE: 97.4 F | DIASTOLIC BLOOD PRESSURE: 70 MMHG | SYSTOLIC BLOOD PRESSURE: 90 MMHG | HEART RATE: 65 BPM | HEIGHT: 60 IN

## 2019-06-03 VITALS — HEIGHT: 60 IN | WEIGHT: 105 LBS | BODY MASS INDEX: 20.62 KG/M2

## 2019-06-03 DIAGNOSIS — R46.81 OBSESSIVE-COMPULSIVE BEHAVIOR: ICD-10-CM

## 2019-06-03 DIAGNOSIS — R87.811 VAGINAL HIGH RISK HPV DNA TEST POSITIVE: ICD-10-CM

## 2019-06-03 DIAGNOSIS — Z12.39 BREAST SCREENING, UNSPECIFIED: ICD-10-CM

## 2019-06-03 DIAGNOSIS — F32.A ANXIETY AND DEPRESSION: Primary | ICD-10-CM

## 2019-06-03 DIAGNOSIS — F41.9 ANXIETY AND DEPRESSION: Primary | ICD-10-CM

## 2019-06-03 DIAGNOSIS — Z71.6 ENCOUNTER FOR SMOKING CESSATION COUNSELING: ICD-10-CM

## 2019-06-03 PROCEDURE — 77067 SCR MAMMO BI INCL CAD: CPT

## 2019-06-03 PROCEDURE — 77063 BREAST TOMOSYNTHESIS BI: CPT

## 2019-06-03 PROCEDURE — 3008F BODY MASS INDEX DOCD: CPT | Performed by: FAMILY MEDICINE

## 2019-06-03 PROCEDURE — 99214 OFFICE O/P EST MOD 30 MIN: CPT | Performed by: FAMILY MEDICINE

## 2019-06-05 ENCOUNTER — TELEPHONE (OUTPATIENT)
Dept: FAMILY MEDICINE CLINIC | Facility: CLINIC | Age: 54
End: 2019-06-05

## 2019-06-17 ENCOUNTER — OFFICE VISIT (OUTPATIENT)
Dept: FAMILY MEDICINE CLINIC | Facility: CLINIC | Age: 54
End: 2019-06-17
Payer: COMMERCIAL

## 2019-06-17 VITALS
WEIGHT: 98.8 LBS | SYSTOLIC BLOOD PRESSURE: 120 MMHG | DIASTOLIC BLOOD PRESSURE: 60 MMHG | OXYGEN SATURATION: 99 % | HEART RATE: 67 BPM | TEMPERATURE: 97.7 F | BODY MASS INDEX: 19.14 KG/M2

## 2019-06-17 DIAGNOSIS — H10.12 ALLERGIC CONJUNCTIVITIS OF LEFT EYE: Primary | ICD-10-CM

## 2019-06-17 DIAGNOSIS — J30.9 ALLERGIC RHINITIS, UNSPECIFIED SEASONALITY, UNSPECIFIED TRIGGER: ICD-10-CM

## 2019-06-17 DIAGNOSIS — H57.9 SENSATION OF FOREIGN BODY IN EYE: ICD-10-CM

## 2019-06-17 PROCEDURE — 4004F PT TOBACCO SCREEN RCVD TLK: CPT | Performed by: FAMILY MEDICINE

## 2019-06-17 PROCEDURE — 99213 OFFICE O/P EST LOW 20 MIN: CPT | Performed by: FAMILY MEDICINE

## 2019-06-17 RX ORDER — FLUTICASONE PROPIONATE 50 MCG
1 SPRAY, SUSPENSION (ML) NASAL DAILY
Qty: 1 BOTTLE | Refills: 1 | Status: SHIPPED | OUTPATIENT
Start: 2019-06-17 | End: 2019-10-21 | Stop reason: ALTCHOICE

## 2019-06-17 RX ORDER — OLOPATADINE HYDROCHLORIDE 2 MG/ML
1 SOLUTION/ DROPS OPHTHALMIC DAILY
Qty: 2.5 ML | Refills: 1 | Status: SHIPPED | OUTPATIENT
Start: 2019-06-17 | End: 2019-10-21 | Stop reason: ALTCHOICE

## 2019-06-17 RX ORDER — AZELASTINE 1 MG/ML
1 SPRAY, METERED NASAL 2 TIMES DAILY
Qty: 1 BOTTLE | Refills: 1 | Status: CANCELLED | OUTPATIENT
Start: 2019-06-17

## 2019-06-17 RX ORDER — OLOPATADINE HYDROCHLORIDE 2 MG/ML
1 SOLUTION/ DROPS OPHTHALMIC DAILY
Qty: 2.5 ML | Refills: 1 | Status: SHIPPED | OUTPATIENT
Start: 2019-06-17 | End: 2019-06-17 | Stop reason: SDUPTHER

## 2019-06-18 ENCOUNTER — TELEPHONE (OUTPATIENT)
Dept: FAMILY MEDICINE CLINIC | Facility: CLINIC | Age: 54
End: 2019-06-18

## 2019-06-18 DIAGNOSIS — H10.10 ALLERGIC CONJUNCTIVITIS, UNSPECIFIED LATERALITY: Primary | ICD-10-CM

## 2019-06-18 RX ORDER — AZELASTINE HYDROCHLORIDE 0.5 MG/ML
1 SOLUTION/ DROPS OPHTHALMIC 2 TIMES DAILY
Qty: 6 ML | Refills: 1 | Status: SHIPPED | OUTPATIENT
Start: 2019-06-18 | End: 2019-10-21 | Stop reason: ALTCHOICE

## 2019-06-19 ENCOUNTER — TELEPHONE (OUTPATIENT)
Dept: FAMILY MEDICINE CLINIC | Facility: CLINIC | Age: 54
End: 2019-06-19

## 2019-06-19 DIAGNOSIS — H10.10 ALLERGIC CONJUNCTIVITIS, UNSPECIFIED LATERALITY: Primary | ICD-10-CM

## 2019-06-19 RX ORDER — KETOTIFEN FUMARATE 0.35 MG/ML
1 SOLUTION/ DROPS OPHTHALMIC 2 TIMES DAILY
Qty: 5 ML | Refills: 0 | Status: SHIPPED | OUTPATIENT
Start: 2019-06-19 | End: 2019-10-21 | Stop reason: ALTCHOICE

## 2019-06-20 ENCOUNTER — TELEPHONE (OUTPATIENT)
Dept: FAMILY MEDICINE CLINIC | Facility: CLINIC | Age: 54
End: 2019-06-20

## 2019-06-25 ENCOUNTER — TELEPHONE (OUTPATIENT)
Dept: FAMILY MEDICINE CLINIC | Facility: CLINIC | Age: 54
End: 2019-06-25

## 2019-07-19 DIAGNOSIS — F41.9 ANXIETY: ICD-10-CM

## 2019-07-22 RX ORDER — CLONAZEPAM 0.5 MG/1
TABLET ORAL
Qty: 60 TABLET | Refills: 2 | Status: SHIPPED | OUTPATIENT
Start: 2019-07-22 | End: 2019-12-06 | Stop reason: SDUPTHER

## 2019-07-28 ENCOUNTER — OFFICE VISIT (OUTPATIENT)
Dept: URGENT CARE | Facility: CLINIC | Age: 54
End: 2019-07-28
Payer: COMMERCIAL

## 2019-07-28 VITALS
HEIGHT: 61 IN | TEMPERATURE: 97.8 F | SYSTOLIC BLOOD PRESSURE: 97 MMHG | WEIGHT: 98.6 LBS | HEART RATE: 72 BPM | OXYGEN SATURATION: 100 % | BODY MASS INDEX: 18.61 KG/M2 | RESPIRATION RATE: 16 BRPM | DIASTOLIC BLOOD PRESSURE: 53 MMHG

## 2019-07-28 DIAGNOSIS — L01.00 IMPETIGO: Primary | ICD-10-CM

## 2019-07-28 PROCEDURE — 99213 OFFICE O/P EST LOW 20 MIN: CPT | Performed by: NURSE PRACTITIONER

## 2019-07-28 RX ORDER — CLINDAMYCIN PHOSPHATE 11.9 MG/ML
SOLUTION TOPICAL
Refills: 0 | COMMUNITY
Start: 2019-07-19 | End: 2019-10-21 | Stop reason: ALTCHOICE

## 2019-07-28 RX ORDER — AZITHROMYCIN 250 MG/1
TABLET, FILM COATED ORAL
Qty: 6 TABLET | Refills: 0 | Status: SHIPPED | OUTPATIENT
Start: 2019-07-28 | End: 2019-08-01

## 2019-07-28 NOTE — PROGRESS NOTES
[facility]        NAME: Raman Odonnell is a 47 y o  female  : 1965    MRN: [id number]  DATE: 2019  TIME: 12:54 PM    Assessment and Plan   Impetigo [L01 00]  1  Impetigo  azithromycin (ZITHROMAX) 250 mg tablet         Patient Instructions     Clean areas with warm soapy water  Take antibiotic as prescribed  Was all sheets and towels on hot  Follow up with PCP or dermatology of no improvement    Follow up with PCP in 3-5 days  Proceed to  ER if symptoms worsen  Chief Complaint     Chief Complaint   Patient presents with    Rash     started a few days ago, noticed what she originally thought to be "bug bites" on bilateral legs, itchy raised red spots that recently started oozing clear, yellow  Today she has one on her right arm as well  Tried Zanfel and Nix OTC  History of Present Illness       48 y/o female presents for wide spread vesicular rash that began 1 week ago  She states the areas are itchy, they then ooze yellowish fluid and scab over  Initially she though it was poison ivy, and used OTC zanfel with no relief  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, scabies, lice, and other skin conditions  The rash is present to her legs and arms  There is no presentation on her trunk  Review of Systems   Review of Systems   Constitutional: Negative for chills and fever  Respiratory: Negative for cough  Cardiovascular: Negative for chest pain and palpitations  Gastrointestinal: Negative for nausea and vomiting  Skin: Positive for color change and rash           Current Medications       Current Outpatient Medications:     citalopram (CeleXA) 10 mg tablet, Take 1 tablet (10 mg total) by mouth daily, Disp: 30 tablet, Rfl: 5    clindamycin (CLEOCIN T) 1 % external solution, , Disp: , Rfl: 0    clonazePAM (KlonoPIN) 0 5 mg tablet, take 1 tablet by mouth twice a day if needed, Disp: 60 tablet, Rfl: 2    FINACEA 15 % cream, , Disp: , Rfl: 0    azelastine (OPTIVAR) 0 05 % ophthalmic solution, Apply 1 drop to eye 2 (two) times a day (Patient not taking: Reported on 7/28/2019), Disp: 6 mL, Rfl: 1    azithromycin (ZITHROMAX) 250 mg tablet, Take 2 tablets today then 1 tablet daily x 4 days, Disp: 6 tablet, Rfl: 0    fluticasone (FLONASE) 50 mcg/act nasal spray, 1 spray into each nostril daily (Patient not taking: Reported on 7/28/2019), Disp: 1 Bottle, Rfl: 1    ketotifen (ZADITOR) 0 025 % ophthalmic solution, Administer 1 drop to both eyes 2 (two) times a day (Patient not taking: Reported on 7/28/2019), Disp: 5 mL, Rfl: 0    olopatadine HCl (PATADAY) 0 2 % opth drops, Administer 1 drop to both eyes daily (Patient not taking: Reported on 7/28/2019), Disp: 2 5 mL, Rfl: 1    Current Allergies     Allergies as of 07/28/2019    (No Known Allergies)            The following portions of the patient's history were reviewed and updated as appropriate: allergies, current medications, past family history, past medical history, past social history, past surgical history and problem list      Past Medical History:   Diagnosis Date    Eczema     Lyme disease        Past Surgical History:   Procedure Laterality Date    AUGMENTATION MAMMAPLASTY Bilateral 1990       Family History   Problem Relation Age of Onset    Diabetes Mother     Hyperlipidemia Father     No Known Problems Sister     Breast cancer additional onset Paternal Grandmother     No Known Problems Sister     Breast cancer additional onset Paternal Aunt          Medications have been verified  Objective   BP 97/53   Pulse 72   Temp 97 8 °F (36 6 °C) (Tympanic)   Resp 16   Ht 5' 1" (1 549 m)   Wt 44 7 kg (98 lb 9 6 oz)   SpO2 100%   BMI 18 63 kg/m²        Physical Exam     Physical Exam   Constitutional: She is oriented to person, place, and time  She appears well-developed and well-nourished  No distress  Cardiovascular: Normal rate and regular rhythm     Pulmonary/Chest: Effort normal and breath sounds normal    Neurological: She is alert and oriented to person, place, and time  Skin: Skin is warm and dry  Rash noted  Rash is vesicular  To the bilateral legs and right forearm there are scattered honey colored vesicles  Some of the lesions are oozing clear to yellow drainage  There are no vesicles on the trunk or face  They are pruritic  Nursing note and vitals reviewed

## 2019-07-31 ENCOUNTER — OFFICE VISIT (OUTPATIENT)
Dept: FAMILY MEDICINE CLINIC | Facility: CLINIC | Age: 54
End: 2019-07-31
Payer: COMMERCIAL

## 2019-07-31 VITALS
DIASTOLIC BLOOD PRESSURE: 60 MMHG | TEMPERATURE: 97.5 F | HEART RATE: 78 BPM | BODY MASS INDEX: 18.88 KG/M2 | SYSTOLIC BLOOD PRESSURE: 98 MMHG | OXYGEN SATURATION: 96 % | WEIGHT: 99.9 LBS

## 2019-07-31 DIAGNOSIS — R21 RASH: Primary | ICD-10-CM

## 2019-07-31 PROCEDURE — 99213 OFFICE O/P EST LOW 20 MIN: CPT | Performed by: FAMILY MEDICINE

## 2019-07-31 RX ORDER — DIAPER,BRIEF,INFANT-TODD,DISP
EACH MISCELLANEOUS 4 TIMES DAILY PRN
Qty: 30 G | Refills: 0 | Status: SHIPPED | OUTPATIENT
Start: 2019-07-31 | End: 2019-10-21 | Stop reason: ALTCHOICE

## 2019-08-05 NOTE — PROGRESS NOTES
Assessment/Plan:       Diagnoses and all orders for this visit:    Rash  Appears to be bug bites  Advised patient to avoid scratching them as best she can  -     hydrocortisone 1 % cream; Apply topically 4 (four) times a day as needed for irritation  -     diphenhydrAMINE (BENADRYL) 2 % cream; Apply topically 3 (three) times a day as needed for itching  No sign of infection at this time  If any of the spots get more red or if she notices pus coming out, advised patient to call us           Subjective:      Patient ID: Tawanna Del Cid is a 47 y o  female  46 y/o female presents complaining of bumps on her body  Patient states that she was gardening recently and after saw several red bumps on her body  She states they initially appeared as large whelts but have since decreased in size  They are very itchy  She has been scratching them and has noticed a clear fluid draining out initially  Patient was seen in the urgent care regarding this and was prescribed Mupirocin  Patient denies use of new detergent, soap, perfume or wearing new store bought clothing  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  Denies fever, chills or shortness of breath  The following portions of the patient's history were reviewed and updated as appropriate: allergies, current medications, past family history, past medical history, past social history, past surgical history and problem list     Review of Systems   Constitutional: Negative for activity change, chills and fever  Respiratory: Negative for cough, shortness of breath and wheezing  Cardiovascular: Negative for chest pain, palpitations and leg swelling  Gastrointestinal: Negative for abdominal pain, constipation, diarrhea, nausea and vomiting  Genitourinary: Negative  Musculoskeletal: Negative for gait problem, neck pain and neck stiffness  Skin:        Red bumps on legs, arms and neck   Neurological: Negative  Objective:      BP 98/60 (BP Location: Left arm, Patient Position: Sitting, Cuff Size: Adult)   Pulse 78   Temp 97 5 °F (36 4 °C) (Tympanic)   Wt 45 3 kg (99 lb 14 4 oz)   SpO2 96%   BMI 18 88 kg/m²          Physical Exam   Constitutional: She appears well-developed and well-nourished  No distress  Cardiovascular: Normal rate, regular rhythm and normal heart sounds  Pulmonary/Chest: Effort normal and breath sounds normal  No respiratory distress  She has no wheezes  Abdominal: Soft  Bowel sounds are normal  She exhibits no distension  There is no tenderness  Musculoskeletal: Normal range of motion  She exhibits no edema, tenderness or deformity  Skin: Skin is warm  She is not diaphoretic  0 5cm red, non-blanching, spots diffusely spread on legs, arms and neck with some    Psychiatric: Her behavior is normal  Judgment and thought content normal  Her mood appears anxious  Nursing note and vitals reviewed

## 2019-10-18 DIAGNOSIS — F41.9 ANXIETY: ICD-10-CM

## 2019-10-20 ENCOUNTER — APPOINTMENT (EMERGENCY)
Dept: RADIOLOGY | Facility: HOSPITAL | Age: 54
End: 2019-10-20
Payer: COMMERCIAL

## 2019-10-20 ENCOUNTER — HOSPITAL ENCOUNTER (EMERGENCY)
Facility: HOSPITAL | Age: 54
Discharge: HOME | End: 2019-10-20
Attending: EMERGENCY MEDICINE
Payer: COMMERCIAL

## 2019-10-20 VITALS
OXYGEN SATURATION: 100 % | WEIGHT: 101.63 LBS | HEART RATE: 75 BPM | TEMPERATURE: 98.8 F | SYSTOLIC BLOOD PRESSURE: 107 MMHG | DIASTOLIC BLOOD PRESSURE: 58 MMHG | RESPIRATION RATE: 16 BRPM | BODY MASS INDEX: 19.2 KG/M2

## 2019-10-20 DIAGNOSIS — Z72.0 TOBACCO ABUSE: ICD-10-CM

## 2019-10-20 DIAGNOSIS — J18.9 PNEUMONIA: Primary | ICD-10-CM

## 2019-10-20 DIAGNOSIS — R91.8 LUNG MASS: ICD-10-CM

## 2019-10-20 LAB
ALBUMIN SERPL BCP-MCNC: 2.8 G/DL (ref 3.5–5)
ALP SERPL-CCNC: 47 U/L (ref 46–116)
ALT SERPL W P-5'-P-CCNC: 14 U/L (ref 12–78)
ANION GAP SERPL CALCULATED.3IONS-SCNC: 6 MMOL/L (ref 4–13)
AST SERPL W P-5'-P-CCNC: 12 U/L (ref 5–45)
BASOPHILS # BLD MANUAL: 0 THOUSAND/UL (ref 0–0.1)
BASOPHILS NFR MAR MANUAL: 0 % (ref 0–1)
BILIRUB SERPL-MCNC: 0.3 MG/DL (ref 0.2–1)
BUN SERPL-MCNC: 10 MG/DL (ref 5–25)
CALCIUM SERPL-MCNC: 8.1 MG/DL (ref 8.3–10.1)
CHLORIDE SERPL-SCNC: 100 MMOL/L (ref 100–108)
CO2 SERPL-SCNC: 32 MMOL/L (ref 21–32)
CREAT SERPL-MCNC: 0.66 MG/DL (ref 0.6–1.3)
EOSINOPHIL # BLD MANUAL: 0.15 THOUSAND/UL (ref 0–0.4)
EOSINOPHIL NFR BLD MANUAL: 2 % (ref 0–6)
ERYTHROCYTE [DISTWIDTH] IN BLOOD BY AUTOMATED COUNT: 11.9 % (ref 11.6–15.1)
GFR SERPL CREATININE-BSD FRML MDRD: 101 ML/MIN/1.73SQ M
GLUCOSE SERPL-MCNC: 89 MG/DL (ref 65–140)
HCT VFR BLD AUTO: 34.5 % (ref 34.8–46.1)
HGB BLD-MCNC: 11.2 G/DL (ref 11.5–15.4)
LYMPHOCYTES # BLD AUTO: 1.73 THOUSAND/UL (ref 0.6–4.47)
LYMPHOCYTES # BLD AUTO: 23 % (ref 14–44)
MCH RBC QN AUTO: 32.2 PG (ref 26.8–34.3)
MCHC RBC AUTO-ENTMCNC: 32.5 G/DL (ref 31.4–37.4)
MCV RBC AUTO: 99 FL (ref 82–98)
MONOCYTES # BLD AUTO: 0.53 THOUSAND/UL (ref 0–1.22)
MONOCYTES NFR BLD: 7 % (ref 4–12)
NEUTROPHILS # BLD MANUAL: 4.73 THOUSAND/UL (ref 1.85–7.62)
NEUTS BAND NFR BLD MANUAL: 4 % (ref 0–8)
NEUTS SEG NFR BLD AUTO: 59 % (ref 43–75)
NRBC BLD AUTO-RTO: 0 /100 WBCS
PLATELET # BLD AUTO: 188 THOUSANDS/UL (ref 149–390)
PLATELET BLD QL SMEAR: ADEQUATE
PMV BLD AUTO: 10.4 FL (ref 8.9–12.7)
POTASSIUM SERPL-SCNC: 3.7 MMOL/L (ref 3.5–5.3)
PROT SERPL-MCNC: 6.4 G/DL (ref 6.4–8.2)
RBC # BLD AUTO: 3.48 MILLION/UL (ref 3.81–5.12)
RBC MORPH BLD: NORMAL
SODIUM SERPL-SCNC: 138 MMOL/L (ref 136–145)
TOTAL CELLS COUNTED SPEC: 100
VARIANT LYMPHS # BLD AUTO: 5 %
WBC # BLD AUTO: 7.5 THOUSAND/UL (ref 4.31–10.16)

## 2019-10-20 PROCEDURE — 85007 BL SMEAR W/DIFF WBC COUNT: CPT | Performed by: EMERGENCY MEDICINE

## 2019-10-20 PROCEDURE — 85027 COMPLETE CBC AUTOMATED: CPT | Performed by: EMERGENCY MEDICINE

## 2019-10-20 PROCEDURE — 71275 CT ANGIOGRAPHY CHEST: CPT

## 2019-10-20 PROCEDURE — 36415 COLL VENOUS BLD VENIPUNCTURE: CPT | Performed by: EMERGENCY MEDICINE

## 2019-10-20 PROCEDURE — 71250 CT THORAX DX C-: CPT

## 2019-10-20 PROCEDURE — 80053 COMPREHEN METABOLIC PANEL: CPT | Performed by: EMERGENCY MEDICINE

## 2019-10-20 PROCEDURE — 99284 EMERGENCY DEPT VISIT MOD MDM: CPT

## 2019-10-20 PROCEDURE — 96360 HYDRATION IV INFUSION INIT: CPT

## 2019-10-20 PROCEDURE — 71046 X-RAY EXAM CHEST 2 VIEWS: CPT

## 2019-10-20 RX ORDER — LEVOFLOXACIN 500 MG/1
500 TABLET, FILM COATED ORAL DAILY
Qty: 7 TABLET | Refills: 0 | Status: SHIPPED | OUTPATIENT
Start: 2019-10-20 | End: 2019-10-27

## 2019-10-20 RX ORDER — LEVOFLOXACIN 500 MG/1
500 TABLET, FILM COATED ORAL ONCE
Status: COMPLETED | OUTPATIENT
Start: 2019-10-20 | End: 2019-10-20

## 2019-10-20 RX ADMIN — IOHEXOL 85 ML: 350 INJECTION, SOLUTION INTRAVENOUS at 21:13

## 2019-10-20 RX ADMIN — SODIUM CHLORIDE 1000 ML: 0.9 INJECTION, SOLUTION INTRAVENOUS at 21:15

## 2019-10-20 RX ADMIN — LEVOFLOXACIN 500 MG: 500 TABLET, FILM COATED ORAL at 22:12

## 2019-10-20 NOTE — ED PROVIDER NOTES
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 Taking? clonazePAM (KlonoPIN) 0 5 mg tablet   No No   Sig: take 1 tablet by mouth twice a day if needed      Facility-Administered Medications: None       Past Medical History:   Diagnosis Date    Eczema     Lyme disease        Past Surgical History:   Procedure Laterality Date    AUGMENTATION MAMMAPLASTY Bilateral 1990       Family History   Problem Relation Age of Onset    Diabetes Mother     Hyperlipidemia Father     No Known Problems Sister     Breast cancer additional onset Paternal Grandmother     No Known Problems Sister     Breast cancer additional onset Paternal Aunt      I have reviewed and agree with the history as documented  Social History     Tobacco Use    Smoking status: Current Every Day Smoker     Packs/day: 0 50    Smokeless tobacco: Never Used   Substance Use Topics    Alcohol use: No     Comment: social alcohol use as per allscripts    Drug use: No        Review of Systems   Constitutional: Negative for activity change, chills, diaphoresis and fever  HENT: Negative for congestion, ear pain, nosebleeds, sore throat, trouble swallowing and voice change  Eyes: Negative for pain, discharge and redness     Respiratory: Negative for apnea, cough, choking, shortness of breath, wheezing and stridor  Cardiovascular: Positive for chest pain  Negative for palpitations  Gastrointestinal: Negative for abdominal distention, abdominal pain, constipation, diarrhea, nausea and vomiting  Endocrine: Negative for polydipsia  Genitourinary: Negative for difficulty urinating, dysuria, flank pain, frequency, hematuria and urgency  Musculoskeletal: Negative for back pain, gait problem, joint swelling, myalgias, neck pain and neck stiffness  Skin: Negative for pallor and rash  Neurological: Negative for dizziness, tremors, syncope, speech difficulty, weakness, numbness and headaches  Hematological: Negative for adenopathy  Psychiatric/Behavioral: Negative for confusion, hallucinations, self-injury and suicidal ideas  The patient is not nervous/anxious  Physical Exam  Physical Exam   Constitutional: She is oriented to person, place, and time  Vital signs are normal  She appears well-developed and well-nourished  No distress  HENT:   Head: Normocephalic and atraumatic  Right Ear: External ear normal    Left Ear: External ear normal    Nose: Nose normal    Mouth/Throat: Oropharynx is clear and moist    Eyes: Pupils are equal, round, and reactive to light  Conjunctivae are normal    Neck: Normal range of motion  Neck supple  Cardiovascular: Normal rate, regular rhythm, normal heart sounds and intact distal pulses  Pulmonary/Chest: Effort normal and breath sounds normal    Abdominal: Soft  Bowel sounds are normal    Musculoskeletal: Normal range of motion  She exhibits tenderness  Point tender right lateral aspect of her rib   Neurological: She is alert and oriented to person, place, and time  She has normal reflexes  Skin: Skin is warm and dry  She is not diaphoretic  Psychiatric: She has a normal mood and affect  Nursing note and vitals reviewed        Vital Signs  ED Triage Vitals [10/20/19 1739]   Temperature Pulse Respirations Blood Pressure SpO2   98 8 °F (37 1 °C) 75 16 107/58 100 %      Temp Source Heart Rate Source Patient Position - Orthostatic VS BP Location FiO2 (%)   Tympanic Monitor Sitting Right arm --      Pain Score       3           Vitals:    10/20/19 1739   BP: 107/58   Pulse: 75   Patient Position - Orthostatic VS: Sitting         Visual Acuity      ED Medications  Medications   sodium chloride 0 9 % bolus 1,000 mL (0 mL Intravenous Stopped 10/20/19 2216)   iohexol (OMNIPAQUE) 350 MG/ML injection (MULTI-DOSE) 100 mL (85 mL Intravenous Given 10/20/19 2113)   levofloxacin (LEVAQUIN) tablet 500 mg (500 mg Oral Given 10/20/19 2212)       Diagnostic Studies  Results Reviewed     Procedure Component Value Units Date/Time    CBC and differential [[id number]]  (Abnormal) Collected:  10/20/19 2122    Lab Status:  Final result Specimen:  Blood from Arm, Left Updated:  10/20/19 2145     WBC 7 50 Thousand/uL      RBC 3 48 Million/uL      Hemoglobin 11 2 g/dL      Hematocrit 34 5 %      MCV 99 fL      MCH 32 2 pg      MCHC 32 5 g/dL      RDW 11 9 %      MPV 10 4 fL      Platelets 691 Thousands/uL      nRBC 0 /100 WBCs     Narrative: This is an appended report  These results have been appended to a previously verified report      Comprehensive metabolic panel [[id number]]  (Abnormal) Collected:  10/20/19 2122    Lab Status:  Final result Specimen:  Blood from Arm, Left Updated:  10/20/19 2142     Sodium 138 mmol/L      Potassium 3 7 mmol/L      Chloride 100 mmol/L      CO2 32 mmol/L      ANION GAP 6 mmol/L      BUN 10 mg/dL      Creatinine 0 66 mg/dL      Glucose 89 mg/dL      Calcium 8 1 mg/dL      AST 12 U/L      ALT 14 U/L      Alkaline Phosphatase 47 U/L      Total Protein 6 4 g/dL      Albumin 2 8 g/dL      Total Bilirubin 0 30 mg/dL      eGFR 101 ml/min/1 73sq m     Narrative:       [place] guidelines for Chronic Kidney Disease (CKD):     Stage 1 with normal or high GFR (GFR > 90 mL/min/1 73 square meters)    Stage 2 Mild CKD (GFR = 60-89 mL/min/1 73 square meters)    Stage 3A Moderate CKD (GFR = 45-59 mL/min/1 73 square meters)    Stage 3B Moderate CKD (GFR = 30-44 mL/min/1 73 square meters)    Stage 4 Severe CKD (GFR = 15-29 mL/min/1 73 square meters)    Stage 5 End Stage CKD (GFR <15 mL/min/1 73 square meters)  Note: GFR calculation is accurate only with a steady state creatinine                 CTA ED chest PE study   Final Result by Reena Shin MD (10/20 2127)      Ill-defined irregular airspace opacity in the right middle lobe and in the right lower lobe (2:32-46; 2:46-54)  Differential includes infection,  or neoplasm    Recommend clinical correlation and follow-up to resolution  Centrilobular nodular groundglass opacities in the lingula is also noted also concerning for transbronchial spread of infection or neoplasm  No pulmonary embolism or aortic dissection  Workstation performed: [id number]         CT chest without contrast   Final Result by Reena Shin MD (10/20 2040)      Ill-defined irregular airspace opacity in the right middle lobe and in the right lower lobe (2:32-46; 2:46-54)  Differential includes infection, infarction, or neoplasm    Recommend clinical correlation and follow-up to resolution  No mediastinal adenopathy  No CT evident osseous changes in the adjacent ribs  I personally discussed this study with Tonya Kothari on 10/20/2019 at 8:39 PM                Workstation performed: [id number]         XR chest 2 views   Final Result by Lisette Hodgkins, MD (10/21 7430)      Right middle lobe pneumonia  Follow-up until clearance recommended              Workstation performed: [id number]                    Procedures  Procedures       ED Course  ED Course as of Oct 21 2341   Ginger Salcedo Oct 20, 2019   2034 Called Radiology at 2024 to get CT read of the chest                                   MDM    Disposition  Final diagnoses:   Pneumonia   Lung mass Tobacco abuse     Time reflects when diagnosis was documented in both MDM as applicable and the Disposition within this note     Time User Action Codes Description Comment    10/20/2019 10:09 PM Kristen Griffin Add [J18 9] Pneumonia     10/20/2019 10:09 PM Kristen Griffin Add [R91 8] Lung mass     10/20/2019 10:12 PM Kristen Griffin Add [Z72 0] Tobacco abuse       ED Disposition     ED Disposition Condition Date/Time Comment    Discharge Stable Sun Oct 20, 2019 10:09 PM Marley Meghanjuju discharge to home/self care  Follow-up Information     Follow up With Specialties Details Why Gayle In 2 days  [address]  [address]      Salas Medel MD Hematology and Oncology, Hematology, Oncology In 2 days Further evaluation of a possible lung mass that was found in your CT scan today   [address] [address]  [phone]            Discharge Medication List as of 10/20/2019 10:12 PM      START taking these medications    Details   levofloxacin (LEVAQUIN) 500 mg tablet Take 1 tablet (500 mg total) by mouth daily for 7 days, Starting Sun 10/20/2019, Until Sun 10/27/2019, Normal         CONTINUE these medications which have NOT CHANGED    Details   clonazePAM (KlonoPIN) 0 5 mg tablet take 1 tablet by mouth twice a day if needed, Normal      azelastine (OPTIVAR) 0 05 % ophthalmic solution Apply 1 drop to eye 2 (two) times a day, Starting Tue 6/18/2019, Normal      citalopram (CeleXA) 10 mg tablet Take 1 tablet (10 mg total) by mouth daily, Starting Mon 4/22/2019, Normal      clindamycin (CLEOCIN T) 1 % external solution Starting Fri 7/19/2019, Historical Med      diphenhydrAMINE (BENADRYL) 2 % cream Apply topically 3 (three) times a day as needed for itching, Starting Wed 7/31/2019, Normal      FINACEA 15 % cream Starting Tue 7/3/2018, Historical Med      fluticasone (FLONASE) 50 mcg/act nasal spray 1 spray into each nostril daily, Starting Mon 6/17/2019, Normal      hydrocortisone 1 % cream Apply topically 4 (four) times a day as needed for irritation, Starting Wed 7/31/2019, Normal      ketotifen (ZADITOR) 0 025 % ophthalmic solution Administer 1 drop to both eyes 2 (two) times a day, Starting Wed 6/19/2019, Normal      olopatadine HCl (PATADAY) 0 2 % opth drops Administer 1 drop to both eyes daily, Starting Mon 6/17/2019, Normal           No discharge procedures on file      ED Provider  Electronically Signed by           Oneida Martínez DO  10/21/19 5545

## 2019-10-21 ENCOUNTER — TELEPHONE (OUTPATIENT)
Dept: SURGICAL ONCOLOGY | Facility: CLINIC | Age: 54
End: 2019-10-21

## 2019-10-21 ENCOUNTER — OFFICE VISIT (OUTPATIENT)
Dept: FAMILY MEDICINE CLINIC | Facility: CLINIC | Age: 54
End: 2019-10-21
Payer: COMMERCIAL

## 2019-10-21 VITALS
HEIGHT: 61 IN | BODY MASS INDEX: 19.63 KG/M2 | SYSTOLIC BLOOD PRESSURE: 110 MMHG | DIASTOLIC BLOOD PRESSURE: 62 MMHG | WEIGHT: 104 LBS | HEART RATE: 81 BPM | RESPIRATION RATE: 18 BRPM | TEMPERATURE: 98.2 F | OXYGEN SATURATION: 97 %

## 2019-10-21 DIAGNOSIS — R91.8 LUNG MASS: ICD-10-CM

## 2019-10-21 DIAGNOSIS — Z71.6 ENCOUNTER FOR SMOKING CESSATION COUNSELING: ICD-10-CM

## 2019-10-21 DIAGNOSIS — J18.9 PNEUMONIA OF RIGHT MIDDLE LOBE DUE TO INFECTIOUS ORGANISM: Primary | ICD-10-CM

## 2019-10-21 PROCEDURE — 99212 OFFICE O/P EST SF 10 MIN: CPT | Performed by: FAMILY MEDICINE

## 2019-10-21 PROCEDURE — 3008F BODY MASS INDEX DOCD: CPT | Performed by: FAMILY MEDICINE

## 2019-10-21 RX ORDER — AZELAIC ACID 0.15 G/G
GEL TOPICAL 2 TIMES DAILY
COMMUNITY

## 2019-10-21 RX ORDER — CITALOPRAM 10 MG/1
TABLET ORAL
Qty: 30 TABLET | Refills: 5 | Status: SHIPPED | OUTPATIENT
Start: 2019-10-21 | End: 2020-08-07

## 2019-10-21 RX ORDER — NICOTINE 21 MG/24HR
1 PATCH, TRANSDERMAL 24 HOURS TRANSDERMAL EVERY 24 HOURS
Qty: 28 PATCH | Refills: 1 | Status: SHIPPED | OUTPATIENT
Start: 2019-10-21 | End: 2020-03-14

## 2019-10-21 NOTE — DISCHARGE INSTRUCTIONS
Please take a list of all of your medications and discharge paperwork with you to all of your follow-up medical visits  Please take all of your medications as directed  Please call your family doctor or return to the ER if you have increased shortness of breath, chest pain, fevers, chills, nausea, vomiting, diarrhea, or any other worsening symptoms  How to Stop Smoking   WHAT YOU NEED TO KNOW:   Why should I stop smoking? You will improve your health and the health of others around you if you stop smoking  Your risk for heart and lung disease, cancer, stroke, heart attack, and vision problems will also decrease  You can benefit from quitting no matter how long you have smoked  How can I prepare to stop smoking? Nicotine is a highly addictive drug found in cigarettes  Withdrawal symptoms can happen when you stop smoking and make it hard to quit  These include anxiety, depression, irritability, trouble sleeping, and increased appetite  You increase your chances of success if you prepare to quit  · Set a quit date  Luane Aschoff a date that is within the next 2 weeks  Do not pick a day that you think may be stressful or busy  Write down the day or [place] it on your calender  · Tell friends and family that you plan to quit  Explain that you may have withdrawal symptoms when you try to quit  Ask them to support you  They may be able to encourage you and help reduce your stress to make it easier for you to quit  · Make a list of your reasons for quitting  Put the list somewhere you will see it every day, such as your refrigerator  You can look at the list when you have a craving  · Remove all tobacco and nicotine products from your home, car, and workplace  Also, remove anything else that will tempt you to smoke, such as lighters, matches, or ashtrays  Clean your car, home, and places at work that smell like smoke  The smell of smoke can trigger a craving       · Identify triggers that make you want to smoke   This may include activities, feelings, or people  Also write down 1 way you can deal with each of your triggers  For example, if you want to smoke as soon as you wake up, plan another activity during this time, such as exercise  · Make a plan for how you will quit  Learn about the tools that can help you quit, such as medicine, counseling, or nicotine replacement therapy  Choose at least 2 options to help you quit  What are some tools to help me stop smoking? · Counseling  from a trained healthcare provider can provide you with support and skills to quit smoking  The provider will also teach you to manage your withdrawal symptoms and cravings  You may receive counseling from one counselor, in group therapy, or through phone therapy called a quit line  · Nicotine replacement therapy (NRT)  such as nicotine patches, gum, or lozenges may help reduce your nicotine cravings  You may get these without a doctor's order  Do not use e-cigarettes or smokeless tobacco in place of cigarettes or to help you quit  They still contain nicotine  · Prescription medicines  such as nasal sprays or nicotine inhalers may help reduce your withdrawal symptoms  Other medicines may also be used to reduce your urge to smoke  Ask your healthcare provider about these medicines  You may need to start certain medicines 2 weeks before your quit date for them to work well  · Hypnosis  is a practice that helps guide you through thoughts and feelings  Hypnosis may help decrease your cravings and make you more willing to quit  · Acupuncture therapy  uses very thin needles to balance energy channels in the body  This is thought to help decrease cravings and symptoms of nicotine withdrawal      · Support groups  let you talk to others who are trying to quit or have already quit  It may be helpful to speak with others about how they quit  How can I manage my cravings?    · Avoid situations, people, and places that tempt you to smoke  Go to nonsmoking places, such as libraries or restaurants  Understand what tempts you and try to avoid these things  · Keep your hands busy  Hold things such as a stress ball or pen  · Put candy or toothpicks in your mouth  Keep lollipops, sugarless gum, or toothpicks with you at all times  · Do not have alcohol or caffeine  These drinks may tempt you to smoke  Drink healthy liquids such as water or juice instead  · Reward yourself when you resist your cravings  Rewards will motivate you and help you stay positive  · Do an activity that distracts you from your craving  Examples include going for a walk, exercising, or cleaning  What should I know about weight gain after I quit? You may gain a few pounds after you quit smoking  It is healthier for you to gain a few pounds than to continue to smoke  The following can help you prevent weight gain:  · Eat healthy foods  These include fruits, vegetables, whole-grain breads, low-fat dairy products, beans, lean meats, and fish  Eat healthy snacks, such as low-fat yogurt, if you get hungry between meals  · Drink water before, during, and between meals  This will make your stomach feel full and help prevent you from overeating  Ask your healthcare provider how much liquid to drink each day and which liquids are best for you  · Exercise  Take a walk or do some kind of exercise every day  Ask your healthcare provider what exercise is right for you  This may help reduce your cravings and reduce stress  Where can I find support and more information? · [employer]  Navigator[employer]  Phone: 1- 699 - 098-7973  Web Address: www [employer]  CARE AGREEMENT:   You have the right to help plan your care  Learn about your health condition and how it may be treated  Discuss treatment options with your caregivers to decide what care you want to receive  You always have the right to refuse treatment  The above information is an  only   It is not intended as medical advice for individual conditions or treatments  Talk to your doctor, nurse or pharmacist before following any medical regimen to see if it is safe and effective for you  © 2017 [employer] [employer] Information is for End User's use only and may not be sold, redistributed or otherwise used for commercial purposes  All illustrations and images included in CareNotes® are the copyrighted property of A D A M , Inc  or Adis Pena

## 2019-10-21 NOTE — PROGRESS NOTES
Assessment/Plan:    Right middle lobe PNA  - Continue Levaquin day 2/7 today  - Return precautions discussed    Lung Mass  - Unable to definitely say findings of CT imaging are neoplastic etiology  - Will repeat CT chest with contrast in 2 week following resolution of infection  - Patient scheduled to follow up with Dr Edy Dawson on 11/14      Subjective:      Patient ID: Kulwant Gutierres is a 49 y o  female  HPI    This is a 49year old female with a PMHx of anxiety, depression, tobacco abuse and HLD presenting for follow up evaluation from ED  Patient presented to the ED on 10/20 with right sided stabbing pain in the chest for 6 days  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, so initially thought she just overexerted herself  CXR showed a right middle lobe pneumonia  CT chest with and without contrast was also performed for ?concerns of PE  This showed centrilobular nodular ground glass opacities in the lingula concerning for transbronchial spread of infection or neoplasm  Patient was told she "almost definitively had lung cancer" in the ED and referred to Dr Edy Dawson for follow up  Patient has smoked 0 5-1ppd for the last 39 years, she states that since discharge from the ED, she has cut back to 2-3 cigarettes per day  The following portions of the patient's history were reviewed and updated as appropriate: allergies, current medications, past family history, past medical history, past social history, past surgical history and problem list     Review of Systems   Constitutional: Negative for activity change, fatigue and fever  HENT: Negative for congestion, ear pain, sneezing and sore throat  Respiratory: Negative for cough, shortness of breath and wheezing  Cardiovascular: Negative for chest pain  Gastrointestinal: Negative for abdominal pain, constipation, diarrhea, nausea and vomiting  Skin: Negative  Neurological: Negative for dizziness and headaches         Objective:  /62 (BP Location: Left arm, Patient Position: Sitting, Cuff Size: Adult)   Pulse 81   Temp 98 2 °F (36 8 °C) (Tympanic)   Resp 18   Ht 5' 1" (1 549 m)   Wt 47 2 kg (104 lb)   LMP 10/01/2018   SpO2 97%   BMI 19 65 kg/m²      Physical Exam   Constitutional: She is oriented to person, place, and time  She appears well-developed and well-nourished  No distress  HENT:   Head: Normocephalic and atraumatic  Right Ear: External ear normal    Left Ear: External ear normal    Mouth/Throat: Oropharynx is clear and moist  No oropharyngeal exudate  Eyes: Conjunctivae are normal  Right eye exhibits no discharge  Left eye exhibits no discharge  No scleral icterus  Cardiovascular: Normal rate, regular rhythm and normal heart sounds  Exam reveals no gallop and no friction rub  No murmur heard  Pulmonary/Chest: Effort normal and breath sounds normal  No respiratory distress  She has no wheezes  She has no rales  Abdominal: Soft  Bowel sounds are normal  She exhibits no distension  There is no tenderness  There is no rebound and no guarding  Musculoskeletal: She exhibits no edema or tenderness  Lymphadenopathy:     She has no cervical adenopathy  Neurological: She is alert and oriented to person, place, and time  Skin: Skin is warm and dry  No rash noted  She is not diaphoretic  No erythema  No pallor  Psychiatric: Her behavior is normal  Her mood appears anxious  She exhibits a depressed mood  Vitals reviewed

## 2019-10-21 NOTE — TELEPHONE ENCOUNTER
New Patient Encounter    New Patient Intake Form   Patient Details:  Rosa Maria Martin  1965  [id number]    Background Information:  [facility] [place] starts by opening a telephone encounter and gathering the following information   Who is calling to schedule? If not self, relationship to patient? self   Referring Provider ER   What is the diagnosis? Ill-defined irregular airspace opacity    When was the diagnosis? 10/20/2019   Is patient aware of diagnosis? Yes   Reason for visit? NP DX   Have you had any testing done? If so: when, where? Yes   Are records in [employer]? yes   Was the patient told to bring a disk? no   Scheduling Information:   Preferred [place]:  [place]     Requesting Specific Provider? Gio Sarmiento   Are there any dates/time the patient cannot be seen? no   Counseling Pre-Screen:  If the patient answers YES to any of the below questions, please route to the appropriate location specific counselor    Have you felt anxious or worried about cancer and the treatment you are receiving? No   Has your diagnosis caused physical, emotional, or financial hardship for you? No   Note: Do not ask the patient about transportation issues/needs  Please notate if the patient brings it up and the counselor will schedule accordingly  Miscellaneous: n/a   After completing the above information, please route to Financial Counselor and the appropriate Nurse Navigator for review

## 2019-10-21 NOTE — ED CARE HANDOFF
Emergency Department Sign Out Note        Sign out and transfer of care from previous provider  See Separate Emergency Department note  The patient, Kulwant Gutierres, was evaluated by the previous provider for right-sided chest wall pain  Workup Completed:  noncontrast CT chest    ED Course / Workup Pending (followup): Blood work, CTA lungs PE study                             Procedures  MDM  Number of Diagnoses or Management Options  Lung mass:   Pneumonia:   Tobacco abuse:      Amount and/or Complexity of Data Reviewed  Clinical lab tests: reviewed  Tests in the radiology section of CPT®: reviewed  Tests in the medicine section of CPT®: reviewed  Review and summarize past medical records: yes  Independent visualization of images, tracings, or specimens: yes    Risk of Complications, Morbidity, and/or Mortality  General comments: CTA PE study did not show any acute pulmonary embolism or dissection  CT lungs does show concern for lung mass that may be cancers versus infectious  At this time patient's pain is at a minimum  Patient placed on empiric Levaquin and discharged home with follow-up to PCP as well as oncologist, Dr Edy Dawson for further evaluation of her possible lung mass  Patient has history of smoking  Discussed smoking cessation with patient  Close return instructions given to return to the ER for any worsening symptoms  Patient agrees with discharge plan  Patient well appearing at time of discharge        Patient Progress  Patient progress: stable      Disposition  Final diagnoses:   Pneumonia   Lung mass   Tobacco abuse     Time reflects when diagnosis was documented in both MDM as applicable and the Disposition within this note     Time User Action Codes Description Comment    10/20/2019 10:09 PM Chet Gorman Add [J18 9] Pneumonia     10/20/2019 10:09 PM Malena Harding Add [R91 8] Lung mass     10/20/2019 10:12 PM Malena Harding Add [Z72 0] Tobacco abuse       ED Disposition     ED Disposition Condition Date/Time Comment    Discharge Stable Sun Oct 20, 2019 10:09 PM Armando Ibarra discharge to home/self care  Follow-up Information     Follow up With Specialties Details [facility] [place] In 2 days  [address]  [facility]      Shari Watters MD Hematology and Oncology, Hematology, Oncology In 2 days Further evaluation of a possible lung mass that was found in your CT scan today  [address] [place] [address]  [phone]          Patient's Medications   Discharge Prescriptions    LEVOFLOXACIN (LEVAQUIN) 500 MG TABLET    Take 1 tablet (500 mg total) by mouth daily for 7 days       Start Date: 10/20/2019End Date: 10/27/2019       Order Dose: 500 mg       Quantity: 7 tablet    Refills: 0     No discharge procedures on file         ED Provider  Electronically Signed by     Cecy Mckinley DO  10/20/19 9154

## 2019-10-26 PROBLEM — B96.89 BV (BACTERIAL VAGINOSIS): Status: RESOLVED | Noted: 2018-07-23 | Resolved: 2019-10-26

## 2019-10-26 PROBLEM — N76.0 BV (BACTERIAL VAGINOSIS): Status: RESOLVED | Noted: 2018-07-23 | Resolved: 2019-10-26

## 2019-10-26 PROBLEM — N89.8 VAGINAL ODOR: Status: RESOLVED | Noted: 2019-03-18 | Resolved: 2019-10-26

## 2019-10-31 ENCOUNTER — TELEPHONE (OUTPATIENT)
Dept: FAMILY MEDICINE CLINIC | Facility: CLINIC | Age: 54
End: 2019-10-31

## 2019-10-31 NOTE — TELEPHONE ENCOUNTER
Patient calling c/o anxiety over ct scan result and fear of death  She said that has consumed her life  She is frequently googling about what could be wrong with her and what death feels like  She would like to see Dr Vicente Newberry or Dr Chari Fabry but their schedules dont work with her work schedule today or tomorrow  She is very upset and would like a nurse or doctor to call her back and see if there is anything she can do

## 2019-11-06 ENCOUNTER — OFFICE VISIT (OUTPATIENT)
Dept: FAMILY MEDICINE CLINIC | Facility: CLINIC | Age: 54
End: 2019-11-06
Payer: COMMERCIAL

## 2019-11-06 VITALS
WEIGHT: 100 LBS | TEMPERATURE: 98 F | BODY MASS INDEX: 18.89 KG/M2 | RESPIRATION RATE: 18 BRPM | OXYGEN SATURATION: 99 % | SYSTOLIC BLOOD PRESSURE: 94 MMHG | HEART RATE: 62 BPM | DIASTOLIC BLOOD PRESSURE: 60 MMHG

## 2019-11-06 DIAGNOSIS — F41.9 ANXIETY: ICD-10-CM

## 2019-11-06 DIAGNOSIS — Z92.89 HISTORY OF CT SCAN OF CHEST: ICD-10-CM

## 2019-11-06 DIAGNOSIS — R63.0 LOSS OF APPETITE: ICD-10-CM

## 2019-11-06 DIAGNOSIS — Z78.9 AFEBRILE: ICD-10-CM

## 2019-11-06 DIAGNOSIS — R93.89 ABNORMAL CHEST CT: Primary | ICD-10-CM

## 2019-11-06 PROCEDURE — 99214 OFFICE O/P EST MOD 30 MIN: CPT | Performed by: FAMILY MEDICINE

## 2019-11-06 NOTE — PROGRESS NOTES
Assessment/Plan:     Diagnoses and all orders for this visit:    Abnormal chest CT  - Ill-defined irregular airspace opacity in the Rt middle lobe and in Rt lower lobe with differential including infection and neoplasm  There was also Centrilobular groundglass opacities in the lingula noted which was concerning for transbronchial spread of infection or neoplasm    -patient was reprinted order for repeat Chest CT with contrast after Levaquin 500mg po qd x7days   -advised to continue her usual gym and fitness routine which she has stopped with the assumption she has cancer definitively  Also advised to continue with her social support system which includes friends and family  -Appointment with Oncology on Nov 14th but advised she must get repeat chest CT done as soon as possible  Anxiety  -patient takes 10mg po qd Citalopram  Continue current dose  History of CT scan of chest    Afebrile  -Patient reported she had a fever prior to ED visit for original chest CT which has since resolved after antibiotics for 7 days  Loss of appetite  -continue citalopram 10mg daily, encouraged family/ friends social support, and adhering to her usual diet which has changed to other foods after hearing the news she may have cancer  Subjective:      Patient ID: Tc Rodriguez is a 49 y o  female  HPI  Patient is a 50 yo Female who was seen in the ED on 10/20/19 due to Sharp, jolting pain on lateral aspect of Rt breast  Patient has a Chest CT which showed Ill-defined irregular airspace opacity in the Rt middle lobe and in Rt lower lobe with differential including infection and neoplasm  There was also Centrilobular groundglass opacities in the lingula noted which was concerning for transbronchial spread of infection or neoplasm  Patient had been delivered this information before and had been recommended to have repeat CT with contrast 2 weeks after treatment on Levaquin 500mg po qd   Will reprint order for Chest CT today which patient did not have done yet  She reports no fever today which she had about 3 nights prior to visiting ED  She has an appoitment with Oncology on Nov 14th   Patient quit smoking after ED visit 2 5 weeks ago  Has smoked for about 30 years about 1/2-3/4 PPD  Patient reports she only drinks Etoh about 1-2x per year  She is currently sleeping about 14 hours a day and 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 which she says helps her forget about the possibility of cancer  The following portions of the patient's history were reviewed and updated as appropriate: allergies, current medications, past family history, past medical history, past social history and problem list     Review of Systems   Constitutional: Positive for appetite change (No appetite since being delivered information about lungs in ED)  Negative for chills and fever  HENT: Negative for congestion and sore throat  Eyes: Negative for pain and visual disturbance  Respiratory: Negative for shortness of breath  Cardiovascular: Negative for chest pain and palpitations  Gastrointestinal: Positive for nausea (from patient reported anxiety)  Negative for abdominal pain, blood in stool, constipation and diarrhea  Genitourinary: Negative for dysuria and hematuria  Allergic/Immunologic: Negative for food allergies  Neurological: Negative for headaches         PHQ-9 Depression Screening    PHQ-9:    Frequency of the following problems over the past two weeks:       Little interest or pleasure in doing things:  3 - nearly every day  Feeling down, depressed, or hopeless:  3 - nearly every day  Trouble falling or staying asleep, or sleeping too much:  3 - nearly every day  Feeling tired or having little energy:  3 - nearly every day  Poor appetite or overeating:  3 - nearly every day  Feeling bad about yourself - or that you are a failure or have let yourself or your family down:  0 - not at all  Trouble concentrating on things, such as reading the newspaper or watching television:  3 - nearly every day  Moving or speaking so slowly that other people could have noticed  Or the opposite - being so fidgety or restless that you have been moving around a lot more than usual:  0 - not at all  Thoughts that you would be better off dead, or of hurting yourself in some way:  0 - not at all  PHQ-2 Score:  6  PHQ-9 Score:  18         Objective:      BP 94/60 (BP Location: Left arm, Patient Position: Sitting)   Pulse 62   Temp 98 °F (36 7 °C) (Tympanic)   Resp 18   Wt 45 4 kg (100 lb)   LMP 10/01/2018   SpO2 99%   BMI 18 89 kg/m²          Physical Exam   Constitutional: She is oriented to person, place, and time  She appears well-developed  She appears distressed  Eyes: Right eye exhibits no discharge  Left eye exhibits no discharge  Cardiovascular: Normal rate, regular rhythm and intact distal pulses  Pulmonary/Chest: Effort normal and breath sounds normal    Rt  Lobes were clear on examination today   Abdominal: Soft  Bowel sounds are normal    Musculoskeletal: Normal range of motion  She exhibits no edema  Neurological: She is alert and oriented to person, place, and time  No sensory deficit  She exhibits normal muscle tone  Skin: Skin is warm and dry  Capillary refill takes less than 2 seconds  Psychiatric: She has a normal mood and affect  Vitals reviewed

## 2019-11-07 ENCOUNTER — DOCUMENTATION (OUTPATIENT)
Dept: HEMATOLOGY ONCOLOGY | Facility: MEDICAL CENTER | Age: 54
End: 2019-11-07

## 2019-11-07 NOTE — TELEPHONE ENCOUNTER
Attempted to call patient twice today, however automated message states "the person you are trying to call cannot accept calls right now"

## 2019-11-07 NOTE — PROGRESS NOTES
Nelsy Lozano has a Dr Froilan Kelly appt on 11/14/19 and the doctor will not be in the ofc, we would like to r/s to fri 11/15/19  Unable to leave a message on the pt's cell #, voice message was left on the home phone to call the office

## 2019-11-15 ENCOUNTER — CONSULT (OUTPATIENT)
Dept: HEMATOLOGY ONCOLOGY | Facility: MEDICAL CENTER | Age: 54
End: 2019-11-15
Payer: COMMERCIAL

## 2019-11-15 VITALS
RESPIRATION RATE: 16 BRPM | DIASTOLIC BLOOD PRESSURE: 100 MMHG | TEMPERATURE: 97.6 F | HEIGHT: 61 IN | OXYGEN SATURATION: 97 % | BODY MASS INDEX: 18.5 KG/M2 | HEART RATE: 82 BPM | WEIGHT: 98 LBS | SYSTOLIC BLOOD PRESSURE: 141 MMHG

## 2019-11-15 DIAGNOSIS — R91.8 ABNORMAL LUNG FIELD: Primary | ICD-10-CM

## 2019-11-15 PROCEDURE — 99244 OFF/OP CNSLTJ NEW/EST MOD 40: CPT | Performed by: INTERNAL MEDICINE

## 2019-11-15 NOTE — PROGRESS NOTES
Nelly Elkins  1965  [facility] [address] [id number]  HEMATOLOGY/ONCOLOGY CONSULTATION REPORT    DISCUSSION/SUMMARY:    72-year-old female with no significant past medical history recently seen in the emergency room because of an acute respiratory issue  CTA/chest did not demonstrate a PE; radiologist commented that the findings were concerning for either infection or neoplasm  Mrs Beatrice Sherwood was treated empirically with Levaquin  Since that time, patient has felt well and has had no further pulmonary pains or other pulmonary issues  Clinically there are no concerning oncology findings  A follow-up CT scan has been scheduled  Patient has discontinued tobacco use; this is obviously good  I do not believe that any oncology workup is necessary at this time  The ill-defined airspace opacities need to be clarified  We discussed options  Patient has been referred to Pulmonary for further evaluation  Mrs Beatrice Sherwood is to return here in 4 weeks but this may change depending upon the above  Patient knows to call the hematology/oncology office if there are any other questions or concerns  Carefully review your medication list and verify that the list is accurate and up-to-date  Please call the hematology/oncology office if there are medications missing from the list, medications on the list that you are not currently taking or if there is a dosage or instruction that is different from how you're taking that medication      Patient goals and areas of care:  Pulmonary evaluation  Barriers to care:  None  Patient is able to self-care   ______________________________________________________________________________________    Chief Complaint   Patient presents with    Consult     Abnormal lung findings     History of Present Illness:  72-year-old female recently see the emergency room because of acute sharp right-sided chest pain   Mrs Juana Walker underwent a CTA/chest which demonstrated ill-defined irregular airspace opacity in the right middle lobe and in the right lower lobe  Patient was treated for a pulmonary infection and discharged from the ER  Because of the abnormal lung findings, patient was referred to Oncology  Mrs Juana Walker presented today for evaluation  Patient was quite upset and crying; stating that she was told by the ER physician she had lung cancer and needed to stop smoking and needed to see an oncologist   From a physical standpoint, patient feels okay, baseline  No shortness of breath or dyspnea on exertion  No chest pain or pressure  No additional/recent acute pulmonary pains  No cough, sputum or hemoptysis  No headaches, blurred vision or dizziness  Appetite has been okay, weight is stable  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 ()  No fevers, chills or sweats  No GI,  or GYN issues  Patient has a long history of anxiety and nervousness - worse so recently with the lung issues  Review of Systems   Constitutional: Negative  HENT: Negative  Eyes: Negative  Respiratory: Negative  Cardiovascular: Negative  Gastrointestinal: Negative  Endocrine: Negative  Genitourinary: Negative  Musculoskeletal: Negative  Skin: Negative  Allergic/Immunologic: Negative  Neurological: Negative  Hematological: Negative  Psychiatric/Behavioral: The patient is nervous/anxious  All other systems reviewed and are negative      Patient Active Problem List   Diagnosis    Post-nasal drainage    Allergic rhinitis    Anxiety and depression    Mixed hyperlipidemia    Obsessive-compulsive behavior    Hermila-menopause    Psoriasis    Vaginal high risk HPV DNA test positive    Rosacea    Cough in adult    Subcutaneous cyst    Vaginal dryness, menopausal    Encounter for smoking cessation counseling     Past Medical History:   Diagnosis Date    Anxiety     Depression     Eczema     Lyme disease      Past Surgical History:   Procedure Laterality Date    AUGMENTATION MAMMAPLASTY Bilateral     Past surgical history:  No blood transfusions    Ob gyn:  Prior breast plastic surgery, no other breast associated issues, patient is postmenopausal, no postmenopausal bleeding    Family History   Problem Relation Age of Onset    Diabetes Mother     Hyperlipidemia Father     No Known Problems Sister     Breast cancer additional onset Paternal Grandmother     No Known Problems Sister     Breast cancer additional onset Paternal Aunt     Cancer Paternal Uncle     Family history:  2 sons in good general health, no known familial or genetic diseases    Social History     Socioeconomic History    Marital status:      Spouse name: Not on file    Number of children: Not on file    Years of education: Not on file    Highest education level: Not on file   Occupational History    Not on file   Social Needs    Financial resource strain: Not on file    Food insecurity:     Worry: Not on file     Inability: Not on file    Transportation needs:     Medical: Not on file     Non-medical: Not on file   Tobacco Use    Smoking status: Former Smoker     Last attempt to quit: 10/20/2019     Years since quittin 0    Smokeless tobacco: Never Used   Substance and Sexual Activity    Alcohol use: No     Comment: socially on rare occasion     Drug use: No    Sexual activity: Not on file   Lifestyle    Physical activity:     Days per week: Not on file     Minutes per session: Not on file    Stress: Not on file   Relationships    Social connections:     Talks on phone: Not on file     Gets together: Not on file     Attends [employer] service: Not on file     Active member of club or organization: Not on file     Attends meetings of clubs or organizations: Not on file     Relationship status: Not on file    Intimate partner violence:     Fear of current or ex partner: Not on file     Emotionally abused: Not on file     Physically abused: Not on file     Forced sexual activity: Not on file   Other Topics Concern    Not on file   Social History Narrative    Not on file    Social history:  Patient works at a skilled nursing facility as a , no toxic exposure, approximately 3/4 of a pack of cigarettes a day x 30 years - discontinued a few weeks ago, no drug or alcohol abuse    Current Outpatient Medications:     Azelaic Acid (FINACEA) 15 % cream, Apply topically 2 (two) times a day, Disp: , Rfl:     citalopram (CeleXA) 10 mg tablet, take 1 tablet by mouth once daily, Disp: 30 tablet, Rfl: 5    clonazePAM (KlonoPIN) 0 5 mg tablet, take 1 tablet by mouth twice a day if needed, Disp: 60 tablet, Rfl: 2    nicotine (NICODERM CQ) 14 mg/24hr TD 24 hr patch, Place 1 patch on the skin every 24 hours, Disp: 28 patch, Rfl: 1    No Known Allergies    Vitals:    11/15/19 1308   BP: 141/100   Pulse: 82   Resp: 16   Temp: 97 6 °F (36 4 °C)   SpO2: 97%     Physical Exam   Constitutional: She is oriented to person, place, and time  She appears well-developed and well-nourished  Thin but well-nourished female, no respiratory distress   HENT:   Head: Normocephalic and atraumatic  Right Ear: External ear normal    Left Ear: External ear normal    Mouth/Throat: Oropharynx is clear and moist    Eyes: Pupils are equal, round, and reactive to light  Conjunctivae and EOM are normal    Neck: Normal range of motion  Neck supple  Cardiovascular: Normal rate, regular rhythm, normal heart sounds and intact distal pulses  Pulmonary/Chest: Effort normal and breath sounds normal    Good air entry bilaterally, clear   Abdominal: Soft  Bowel sounds are normal    Soft, nontender, +bowel sounds, no hepatosplenomegaly   Musculoskeletal: Normal range of motion  Neurological: She is alert and oriented to person, place, and time  She has normal reflexes  Skin: Skin is warm     Warm, moist, good color, no petechiae or ecchymoses   Psychiatric: She has a normal mood and affect  Her behavior is normal  Judgment and thought content normal    Extremities:  No lower extremity edema, no cords, pulses are 1+  Lymphatics:  No adenopathy in the neck, supraclavicular region, axilla and groin bilaterally    Labs    10/20/2019 WBC = 7 5 hemoglobin = 11 2 hematocrit = 34 5 MCV = 99 platelet = 134 neutrophil = 59% bands = 4% lymphocytes = 23% monocyte = 7% BUN = 10 creatinine = 0 66 calcium = 8 1 AST = 12 ALT = 14 alkaline phosphatase = 47 total bilirubin = 0 30 total protein = 6 4    Imaging    10/20/2019 CTA chest PE study     LUNGS:  Ill-defined irregular airspace opacity in the right middle lobe and in the right lower lobe  Centrilobular nodular groundglass opacities in the lingula is also noted also concerning for transbronchial spread of infection or neoplasm  Differential includes infection,  or neoplasm    Recommend clinical correlation and follow-up to resolution  IMPRESSION:  Ill-defined irregular airspace opacity in the right middle lobe and in the right lower lobe (2:32-46; 2:46-54)  Differential includes infection,  or neoplasm    Recommend clinical correlation and follow-up to resolution      Centrilobular nodular groundglass opacities in the lingula is also noted also concerning for transbronchial spread of infection or neoplasm      No pulmonary embolism or aortic dissection  06/03/2019 screening bilateral mammogram was category 2, benign, no evidence for malignancy, follow-up in 1 year

## 2019-12-02 ENCOUNTER — HOSPITAL ENCOUNTER (OUTPATIENT)
Dept: RADIOLOGY | Facility: HOSPITAL | Age: 54
Discharge: HOME | End: 2019-12-02
Payer: COMMERCIAL

## 2019-12-02 DIAGNOSIS — R91.8 LUNG MASS: ICD-10-CM

## 2019-12-02 PROCEDURE — 71260 CT THORAX DX C+: CPT

## 2019-12-02 RX ADMIN — IOHEXOL 85 ML: 350 INJECTION, SOLUTION INTRAVENOUS at 09:25

## 2019-12-06 DIAGNOSIS — F41.9 ANXIETY: ICD-10-CM

## 2019-12-16 RX ORDER — CLONAZEPAM 0.5 MG/1
TABLET ORAL
Qty: 60 TABLET | Refills: 5 | Status: SHIPPED | OUTPATIENT
Start: 2019-12-16

## 2020-01-31 ENCOUNTER — TELEPHONE (OUTPATIENT)
Dept: HEMATOLOGY ONCOLOGY | Facility: MEDICAL CENTER | Age: 55
End: 2020-01-31

## 2020-01-31 NOTE — TELEPHONE ENCOUNTER
Dr Regina Whitmore is not here to clarify, but referral was made to discuss CT scan and clarify the ill-defined airspace opacities that were found  This referral was made in November  I do not see documentation where Dr Regina Whitmore says referral is no longer needed

## 2020-02-03 ENCOUNTER — OFFICE VISIT (OUTPATIENT)
Dept: PULMONOLOGY | Facility: MEDICAL CENTER | Age: 55
End: 2020-02-03
Payer: COMMERCIAL

## 2020-02-03 VITALS
RESPIRATION RATE: 12 BRPM | BODY MASS INDEX: 19.07 KG/M2 | HEART RATE: 67 BPM | WEIGHT: 101 LBS | DIASTOLIC BLOOD PRESSURE: 62 MMHG | TEMPERATURE: 96.7 F | SYSTOLIC BLOOD PRESSURE: 114 MMHG | OXYGEN SATURATION: 97 % | HEIGHT: 61 IN

## 2020-02-03 DIAGNOSIS — Z87.891 FORMER SMOKER: ICD-10-CM

## 2020-02-03 DIAGNOSIS — Z87.891 FORMER CIGARETTE SMOKER: Primary | ICD-10-CM

## 2020-02-03 DIAGNOSIS — G47.19 DAYTIME HYPERSOMNOLENCE: ICD-10-CM

## 2020-02-03 DIAGNOSIS — R09.82 POST-NASAL DRAINAGE: ICD-10-CM

## 2020-02-03 DIAGNOSIS — R93.89 ABNORMAL CT SCAN, CHEST: ICD-10-CM

## 2020-02-03 PROBLEM — R91.8 ABNORMAL LUNG FIELD: Status: RESOLVED | Noted: 2019-11-15 | Resolved: 2020-02-03

## 2020-02-03 PROBLEM — R05.9 COUGH IN ADULT: Status: RESOLVED | Noted: 2018-05-29 | Resolved: 2020-02-03

## 2020-02-03 PROCEDURE — 94010 BREATHING CAPACITY TEST: CPT | Performed by: NURSE PRACTITIONER

## 2020-02-03 PROCEDURE — 1036F TOBACCO NON-USER: CPT | Performed by: NURSE PRACTITIONER

## 2020-02-03 PROCEDURE — 3008F BODY MASS INDEX DOCD: CPT | Performed by: NURSE PRACTITIONER

## 2020-02-03 PROCEDURE — 99244 OFF/OP CNSLTJ NEW/EST MOD 40: CPT | Performed by: NURSE PRACTITIONER

## 2020-02-03 NOTE — ASSESSMENT & PLAN NOTE
Patient works 6 days a week  She works as a  in a local nursing home  Her hours of work are 4 40 8:00 p m  4 days a week and then 90 for 2 days a week  She finds herself very fatigued and [place] Sleepiness Scale is 7  This patient also has an oral crowded airway with Mallampati score of 4  She believes that she snores has been told this in the past   I briefly reviewed with patient the anatomy the upper airway diagnosis and treatment of ALEE    She will consider possible home testing and will let me know in the future

## 2020-02-03 NOTE — PROGRESS NOTES
Assessment/Plan:       Problem List Items Addressed This Visit        Other    Post-nasal drainage     Patient does have history of postnasal drip  She has use fluticasone in the past but is been hesitant as she does not like the idea of using a steroid  I did explain to her that this is nasal steroid in little is absorbed into the blood stream   She is asymptomatic at this         Abnormal CT scan, chest     I personally reviewed images of CT of chest that were done ill-defined irregular opacity was noted in the right middle and right lower lobe  She was treated for pneumonia  Patient had a repeat CT that was compared to CT of chest from October 20, 2019  Done December 2, 2020 there was nothing to suggest malignancy  There was marked improvement in the right middle lobe and right lower lobe  I did review images personally as well as Dr Pratik Hartman  Plan includes chest x-ray that will be done in March 2025  Patient is agreeable         Relevant Orders    XR chest pa & lateral    Daytime hypersomnolence     Patient works 6 days a week  She works as a  in a local nursing home  Her hours of work are 4 40 8:00 p m  4 days a week and then 90 for 2 days a week  She finds herself very fatigued and [place] Sleepiness Scale is 7  This patient also has an oral crowded airway with Mallampati score of 4  She believes that she snores has been told this in the past   I briefly reviewed with patient the anatomy the upper airway diagnosis and treatment of ALEE  She will consider possible home testing and will let me know in the future           Former smoker     Norma Dailey is a former smoker  She had PFTs done today that were normal forced vital capacity was 3 01 L or 112% of predicted, FEV1 was 2 23 L or 100% of predicted obstruction ratio 74%  I did personally review her images and there is no evidence of emphysematous changes  She reports now being smoke-free and I applauded her    I encouraged her to remain smoke-free           Other Visit Diagnoses     Former cigarette smoker    -  Primary    Relevant Orders    POCT spirometry (Completed)            No follow-ups on file  All questions are answered to the patient's satisfaction and understanding  She verbalizes understanding  She is encouraged to call with any further questions or concerns  Portions of the record may have been created with voice recognition software  Occasional wrong word or "sound a like" substitutions may have occurred due to the inherent limitations of voice recognition software  Read the chart carefully and recognize, using context, where substitutions have occurred  a  HPI:  [facility] is a 27-year-old female who presented to the emergency room October 20, 2019  She had discomfort in her right breast     She has a history of cigarette smoking and smoked most of her adult life  She stops smoking in October 2019  Patient had a chest x-ray in the ER that showed a right middle lobe pneumonia  She also had a CT of chest for which ill-defined irregular airspace opacity in the right middle lobe and right lower lobe was noted  She went on to have a repeat CT of chest without IV contrast   There was marked improvement in the right middle lobe and right lower lobe consolidation with resolution of tree and bud nodularity in the lingula  Patient does have anxiety and depression  She currently is on clonazepam in and Celexa 10 mg daily  Patient denies any shortness of breath or cough  Electronically Signed by TARIQ Robin    ______________________________________________________________________    Chief Complaint:   Chief Complaint   Patient presents with    Pneumonia     dx in hosp  pt states that she was also told that had lung ca was seen by hematologist dr Haylie Cox referred by him  Patient ID: [facility] is a 49 y o  y o  female has a past medical history of Anxiety, Depression, Eczema, and Lyme disease      2/3/2020  Patient presents today for initial visit  Fatigue   This is a chronic problem  The current episode started more than 1 year ago  The problem occurs daily  The problem has been unchanged  Associated symptoms include fatigue  Nothing aggravates the symptoms  She has tried rest and sleep for the symptoms  The treatment provided mild relief  Occupational/Exposure history:  Pets/Enviroment:  Travel history:  Review of Systems   Constitutional: Positive for fatigue  HENT: Negative  Eyes: Negative  Respiratory: Negative  Cardiovascular: Negative  Gastrointestinal: Negative  Endocrine: Negative  Genitourinary: Negative  Musculoskeletal: Negative  Skin: Negative  Allergic/Immunologic: Negative  Hematological: Negative  Psychiatric/Behavioral: The patient is nervous/anxious  Social history: She reports that she quit smoking about 3 months ago  She has a 30 00 pack-year smoking history  She has never used smokeless tobacco  She reports that she does not drink alcohol or use drugs      Past surgical history:   Past Surgical History:   Procedure Laterality Date    AUGMENTATION MAMMAPLASTY Bilateral 1990     Family history:   Family History   Problem Relation Age of Onset    Diabetes Mother     Hyperlipidemia Father     No Known Problems Sister     Breast cancer additional onset Paternal Grandmother     No Known Problems Sister     Breast cancer additional onset Paternal Aunt     Cancer Paternal Uncle        Immunization History   Administered Date(s) Administered    DT (pediatric) 01/30/2006     Current Outpatient Medications   Medication Sig Dispense Refill    Azelaic Acid (FINACEA) 15 % cream Apply topically 2 (two) times a day      citalopram (CeleXA) 10 mg tablet take 1 tablet by mouth once daily 30 tablet 5    clonazePAM (KlonoPIN) 0 5 mg tablet take 1 tablet by mouth twice a day if needed 60 tablet 5    nicotine (NICODERM CQ) 14 mg/24hr TD 24 hr patch Place 1 patch on the skin every 24 hours 28 patch 1     No current facility-administered medications for this visit  Allergies: Patient has no known allergies  Objective:  Vitals:    02/03/20 1459   BP: 114/62   BP Location: Left arm   Patient Position: Sitting   Cuff Size: Adult   Pulse: 67   Resp: 12   Temp: (!) 96 7 °F (35 9 °C)   TempSrc: Tympanic   SpO2: 97%   Weight: 45 8 kg (101 lb)   Height: 5' 1 25" (1 556 m)   Oxygen Therapy  SpO2: 97 %    Wt Readings from Last 3 Encounters:   02/03/20 45 8 kg (101 lb)   11/15/19 44 5 kg (98 lb)   11/06/19 45 4 kg (100 lb)     Body mass index is 18 93 kg/m²  Physical Exam   Constitutional: She is oriented to person, place, and time  She appears well-developed and well-nourished  HENT:   Head: Normocephalic and atraumatic  Mallampati 4   Eyes: Pupils are equal, round, and reactive to light  EOM are normal    Neck: Normal range of motion  Neck supple  Cardiovascular: Normal rate and regular rhythm  Pulmonary/Chest: Effort normal and breath sounds normal    Abdominal: Soft  Musculoskeletal: Normal range of motion  Neurological: She is alert and oriented to person, place, and time  Skin: Skin is warm and dry  Capillary refill takes less than 2 seconds  Psychiatric: She has a normal mood and affect   Her behavior is normal        Lab Review:   Admission on 10/20/2019, Discharged on 10/20/2019   Component Date Value    WBC 10/20/2019 7 50     RBC 10/20/2019 3 48*    Hemoglobin 10/20/2019 11 2*    Hematocrit 10/20/2019 34 5*    MCV 10/20/2019 99*    MCH 10/20/2019 32 2     MCHC 10/20/2019 32 5     RDW 10/20/2019 11 9     MPV 10/20/2019 10 4     Platelets 28/43/1304 188     nRBC 10/20/2019 0     Sodium 10/20/2019 138     Potassium 10/20/2019 3 7     Chloride 10/20/2019 100     CO2 10/20/2019 32     ANION GAP 10/20/2019 6     BUN 10/20/2019 10     Creatinine 10/20/2019 0 66     Glucose 10/20/2019 89     Calcium 10/20/2019 8 1*    AST 10/20/2019 12     ALT 10/20/2019 14     Alkaline Phosphatase 10/20/2019 47     Total Protein 10/20/2019 6 4     Albumin 10/20/2019 2 8*    Total Bilirubin 10/20/2019 0 30     eGFR 10/20/2019 101     Segmented % 10/20/2019 59     Bands % 10/20/2019 4     Lymphocytes % 10/20/2019 23     Monocytes % 10/20/2019 7     Eosinophils, % 10/20/2019 2     Basophils % 10/20/2019 0     Atypical Lymphocytes % 10/20/2019 5*    Absolute Neutrophils 10/20/2019 4 73     Lymphocytes Absolute 10/20/2019 1 73     Monocytes Absolute 10/20/2019 0 53     Eosinophils Absolute 10/20/2019 0 15     Basophils Absolute 10/20/2019 0 00     Total Counted 10/20/2019 100     RBC Morphology 10/20/2019 Normal     Platelet Estimate 04/39/4767 Adequate          Diagnostics:  I have personally reviewed pertinent films in PACS    Office Spirometry Results:  FEV1: 2 23 liters(106%)  FVC: 3 01 liters(112%)  FEV1/FVC: 74 1 %  ESS: Total score: 6  No results found

## 2020-02-03 NOTE — ASSESSMENT & PLAN NOTE
Delia Bowen is a former smoker  She had PFTs done today that were normal forced vital capacity was 3 01 L or 112% of predicted, FEV1 was 2 23 L or 100% of predicted obstruction ratio 74%  I did personally review her images and there is no evidence of emphysematous changes  She reports now being smoke-free and I applauded her    I encouraged her to remain smoke-free

## 2020-02-03 NOTE — PATIENT INSTRUCTIONS
Ordered a chest x-ray that you will have done any time after  February 23, 2020  U of no evidence of having any emphysema or COPD  Please  Remain smoke-free  I did explain to consideration for possible screening for sleep apnea    If you would like to have a home sleep study you can contact the office

## 2020-02-03 NOTE — ASSESSMENT & PLAN NOTE
I personally reviewed images of CT of chest that were done ill-defined irregular opacity was noted in the right middle and right lower lobe  She was treated for pneumonia  Patient had a repeat CT that was compared to CT of chest from October 20, 2019  Done December 2, 2020 there was nothing to suggest malignancy  There was marked improvement in the right middle lobe and right lower lobe  I did review images personally as well as Dr Estephania Shook  Plan includes chest x-ray that will be done in March 2025   Patient is agreeable

## 2020-02-03 NOTE — ASSESSMENT & PLAN NOTE
Patient does have history of postnasal drip  She has use fluticasone in the past but is been hesitant as she does not like the idea of using a steroid    I did explain to her that this is nasal steroid in little is absorbed into the blood stream   She is asymptomatic at this

## 2020-02-25 ENCOUNTER — TRANSCRIBE ORDERS (OUTPATIENT)
Dept: ADMINISTRATIVE | Facility: HOSPITAL | Age: 55
End: 2020-02-25

## 2020-02-25 DIAGNOSIS — N95.0 POSTMENOPAUSAL BLEEDING: Primary | ICD-10-CM

## 2020-03-03 ENCOUNTER — APPOINTMENT (OUTPATIENT)
Dept: RADIOLOGY | Facility: HOSPITAL | Age: 55
End: 2020-03-03
Attending: OBSTETRICS & GYNECOLOGY
Payer: COMMERCIAL

## 2020-03-03 ENCOUNTER — HOSPITAL ENCOUNTER (OUTPATIENT)
Dept: RADIOLOGY | Facility: HOSPITAL | Age: 55
Discharge: HOME | End: 2020-03-03
Attending: OBSTETRICS & GYNECOLOGY
Payer: COMMERCIAL

## 2020-03-03 DIAGNOSIS — N95.0 POSTMENOPAUSAL BLEEDING: ICD-10-CM

## 2020-03-03 PROCEDURE — 76830 TRANSVAGINAL US NON-OB: CPT

## 2020-03-03 PROCEDURE — 76856 US EXAM PELVIC COMPLETE: CPT

## 2020-03-14 DIAGNOSIS — Z71.6 ENCOUNTER FOR SMOKING CESSATION COUNSELING: ICD-10-CM

## 2020-03-14 RX ORDER — NICOTINE 21 MG/24HR
1 PATCH, TRANSDERMAL 24 HOURS TRANSDERMAL EVERY 24 HOURS
Qty: 28 PATCH | Refills: 3 | Status: SHIPPED | OUTPATIENT
Start: 2020-03-14 | End: 2020-07-10

## 2020-07-10 DIAGNOSIS — Z71.6 ENCOUNTER FOR SMOKING CESSATION COUNSELING: ICD-10-CM

## 2020-07-10 RX ORDER — NICOTINE 21 MG/24HR
1 PATCH, TRANSDERMAL 24 HOURS TRANSDERMAL EVERY 24 HOURS
Qty: 28 PATCH | Refills: 3 | Status: SHIPPED | OUTPATIENT
Start: 2020-07-10 | End: 2020-11-03

## 2020-08-07 DIAGNOSIS — F41.9 ANXIETY: ICD-10-CM

## 2020-08-07 RX ORDER — CITALOPRAM 10 MG/1
TABLET ORAL
Qty: 30 TABLET | Refills: 5 | Status: SHIPPED | OUTPATIENT
Start: 2020-08-07 | End: 2020-10-08 | Stop reason: ALTCHOICE

## 2020-10-08 ENCOUNTER — OFFICE VISIT (OUTPATIENT)
Dept: FAMILY MEDICINE CLINIC | Facility: CLINIC | Age: 55
End: 2020-10-08
Payer: COMMERCIAL

## 2020-10-08 VITALS
OXYGEN SATURATION: 98 % | WEIGHT: 102 LBS | HEIGHT: 62 IN | HEART RATE: 74 BPM | SYSTOLIC BLOOD PRESSURE: 110 MMHG | RESPIRATION RATE: 16 BRPM | BODY MASS INDEX: 18.77 KG/M2 | DIASTOLIC BLOOD PRESSURE: 68 MMHG | TEMPERATURE: 98 F

## 2020-10-08 DIAGNOSIS — G47.00 INSOMNIA, UNSPECIFIED TYPE: Primary | ICD-10-CM

## 2020-10-08 DIAGNOSIS — F41.9 ANXIETY AND DEPRESSION: ICD-10-CM

## 2020-10-08 DIAGNOSIS — E78.2 MIXED HYPERLIPIDEMIA: ICD-10-CM

## 2020-10-08 DIAGNOSIS — F32.A ANXIETY AND DEPRESSION: ICD-10-CM

## 2020-10-08 PROBLEM — N85.02 COMPLEX ATYPICAL ENDOMETRIAL HYPERPLASIA: Status: ACTIVE | Noted: 2020-07-15

## 2020-10-08 PROBLEM — N93.9 ABNORMAL UTERINE BLEEDING (AUB): Status: ACTIVE | Noted: 2020-09-24

## 2020-10-08 PROBLEM — G47.19 DAYTIME HYPERSOMNOLENCE: Status: RESOLVED | Noted: 2020-02-03 | Resolved: 2020-10-08

## 2020-10-08 PROCEDURE — 3725F SCREEN DEPRESSION PERFORMED: CPT | Performed by: FAMILY MEDICINE

## 2020-10-08 PROCEDURE — 99213 OFFICE O/P EST LOW 20 MIN: CPT | Performed by: FAMILY MEDICINE

## 2020-10-08 PROCEDURE — 1036F TOBACCO NON-USER: CPT | Performed by: FAMILY MEDICINE

## 2020-10-08 RX ORDER — NICOTINE 21 MG/24HR
1 PATCH, TRANSDERMAL 24 HOURS TRANSDERMAL
COMMUNITY
Start: 2020-07-10 | End: 2020-10-08 | Stop reason: SDUPTHER

## 2020-10-08 RX ORDER — TRAZODONE HYDROCHLORIDE 50 MG/1
50 TABLET ORAL
Qty: 14 TABLET | Refills: 0 | Status: SHIPPED | OUTPATIENT
Start: 2020-10-08

## 2020-10-08 RX ORDER — CITALOPRAM 20 MG/1
40 TABLET ORAL EVERY EVENING
COMMUNITY
Start: 2020-10-02

## 2020-10-08 RX ORDER — BUSPIRONE HYDROCHLORIDE 5 MG/1
5 TABLET ORAL 2 TIMES DAILY
COMMUNITY
Start: 2020-10-02

## 2020-10-08 RX ORDER — HYDROXYZINE HYDROCHLORIDE 25 MG/1
25 TABLET, FILM COATED ORAL EVERY 6 HOURS PRN
Qty: 30 TABLET | Refills: 0 | Status: SHIPPED | OUTPATIENT
Start: 2020-10-08

## 2020-10-08 RX ORDER — MEGESTROL ACETATE 40 MG/1
40 TABLET ORAL
COMMUNITY
Start: 2020-08-07

## 2020-10-08 RX ORDER — DIPHENOXYLATE HYDROCHLORIDE AND ATROPINE SULFATE 2.5; .025 MG/1; MG/1
1 TABLET ORAL DAILY
COMMUNITY

## 2020-10-14 ENCOUNTER — TELEPHONE (OUTPATIENT)
Dept: FAMILY MEDICINE CLINIC | Facility: CLINIC | Age: 55
End: 2020-10-14

## 2020-10-14 LAB
CHOLEST SERPL-MCNC: 278 MG/DL (ref 100–199)
HDLC SERPL-MCNC: 73 MG/DL
LDLC SERPL CALC-MCNC: 182 MG/DL (ref 0–99)
LDLC/HDLC SERPL: 2.5 RATIO (ref 0–3.2)
SL AMB VLDL CHOLESTEROL CALC: 23 MG/DL (ref 5–40)
TRIGL SERPL-MCNC: 128 MG/DL (ref 0–149)
TSH SERPL DL<=0.005 MIU/L-ACNC: 0.51 UIU/ML (ref 0.45–4.5)

## 2020-11-02 DIAGNOSIS — Z71.6 ENCOUNTER FOR SMOKING CESSATION COUNSELING: ICD-10-CM

## 2020-11-03 RX ORDER — NICOTINE 21 MG/24HR
1 PATCH, TRANSDERMAL 24 HOURS TRANSDERMAL EVERY 24 HOURS
Qty: 28 PATCH | Refills: 3 | Status: SHIPPED | OUTPATIENT
Start: 2020-11-03 | End: 2021-02-22

## 2021-02-21 DIAGNOSIS — Z71.6 ENCOUNTER FOR SMOKING CESSATION COUNSELING: ICD-10-CM

## 2021-02-22 RX ORDER — NICOTINE 21 MG/24HR
1 PATCH, TRANSDERMAL 24 HOURS TRANSDERMAL EVERY 24 HOURS
Qty: 28 PATCH | Refills: 3 | Status: SHIPPED | OUTPATIENT
Start: 2021-02-22 | End: 2021-06-16

## 2021-06-15 DIAGNOSIS — Z71.6 ENCOUNTER FOR SMOKING CESSATION COUNSELING: ICD-10-CM

## 2021-06-16 RX ORDER — NICOTINE 21 MG/24HR
1 PATCH, TRANSDERMAL 24 HOURS TRANSDERMAL EVERY 24 HOURS
Qty: 28 PATCH | Refills: 3 | Status: SHIPPED | OUTPATIENT
Start: 2021-06-16 | End: 2021-10-04

## 2021-08-20 ENCOUNTER — OFFICE VISIT (OUTPATIENT)
Dept: FAMILY MEDICINE CLINIC | Facility: CLINIC | Age: 56
End: 2021-08-20
Payer: COMMERCIAL

## 2021-08-20 VITALS
OXYGEN SATURATION: 97 % | BODY MASS INDEX: 18.22 KG/M2 | DIASTOLIC BLOOD PRESSURE: 56 MMHG | WEIGHT: 99 LBS | TEMPERATURE: 97.3 F | HEIGHT: 62 IN | RESPIRATION RATE: 18 BRPM | SYSTOLIC BLOOD PRESSURE: 92 MMHG | HEART RATE: 80 BPM

## 2021-08-20 DIAGNOSIS — G47.00 INSOMNIA, UNSPECIFIED TYPE: ICD-10-CM

## 2021-08-20 DIAGNOSIS — Z11.59 NEED FOR HEPATITIS C SCREENING TEST: ICD-10-CM

## 2021-08-20 DIAGNOSIS — F32.A ANXIETY AND DEPRESSION: ICD-10-CM

## 2021-08-20 DIAGNOSIS — N76.4 ABSCESS OF LEFT GENITAL LABIA: ICD-10-CM

## 2021-08-20 DIAGNOSIS — R53.82 CHRONIC FATIGUE: ICD-10-CM

## 2021-08-20 DIAGNOSIS — Z12.31 ENCOUNTER FOR SCREENING MAMMOGRAM FOR MALIGNANT NEOPLASM OF BREAST: Primary | ICD-10-CM

## 2021-08-20 DIAGNOSIS — Z11.4 SCREENING FOR HIV (HUMAN IMMUNODEFICIENCY VIRUS): ICD-10-CM

## 2021-08-20 DIAGNOSIS — E78.2 MIXED HYPERLIPIDEMIA: ICD-10-CM

## 2021-08-20 DIAGNOSIS — L73.1 INGROWN HAIR: ICD-10-CM

## 2021-08-20 DIAGNOSIS — Z12.11 SCREENING FOR COLON CANCER: ICD-10-CM

## 2021-08-20 DIAGNOSIS — F41.9 ANXIETY AND DEPRESSION: ICD-10-CM

## 2021-08-20 DIAGNOSIS — Z00.00 ANNUAL PHYSICAL EXAM: ICD-10-CM

## 2021-08-20 PROBLEM — L71.9 ROSACEA: Status: RESOLVED | Noted: 2017-02-06 | Resolved: 2021-08-20

## 2021-08-20 PROCEDURE — 3008F BODY MASS INDEX DOCD: CPT | Performed by: FAMILY MEDICINE

## 2021-08-20 PROCEDURE — 99213 OFFICE O/P EST LOW 20 MIN: CPT | Performed by: FAMILY MEDICINE

## 2021-08-20 PROCEDURE — 1036F TOBACCO NON-USER: CPT | Performed by: FAMILY MEDICINE

## 2021-08-20 RX ORDER — CITALOPRAM 10 MG/1
10 TABLET ORAL EVERY EVENING
COMMUNITY
Start: 2021-05-17

## 2021-08-20 RX ORDER — CEPHALEXIN 500 MG/1
500 CAPSULE ORAL EVERY 6 HOURS SCHEDULED
Qty: 20 CAPSULE | Refills: 0 | Status: SHIPPED | OUTPATIENT
Start: 2021-08-20 | End: 2021-08-25

## 2021-08-20 NOTE — PROGRESS NOTES
Assessment/Plan:      Diagnoses and all orders for this visit:    Encounter for screening mammogram for malignant neoplasm of breast  -     Mammo screening bilateral w 3d & cad; Future    Abscess of left genital labia    Other orders  -     citalopram (CeleXA) 10 mg tablet; Take 10 mg by mouth every evening (Patient not taking: Reported on 8/20/2021)      With my pleasure to see [facility] in the office today  She currently has an ingrown cyst likely secondary to abrasion from shaving in the groin  Advised her to be careful as shaving always using new razor if she is going to continue shaving that area  She should apply warm compresses twice a day  I have also given her 5 days of Keflex for suspicion for possible bacterial introduction  She should have a wound check in approximately 1 week  Given the area of the wound, annual gyn with Pap can also be performed at that time  She should also complete the above preventative exams including colonoscopy and mammogram   She is due for an annual physical,   And have ordered routine labs for her today  Subjective:     Patient ID: Tawanna Del Cid is a 55 y o  female   Who presents today regarding a bump on her groin area that started a few days ago  She denies any fever chills shortness of bed diaphoresis  She states that it is on the vulvar area and she is able to express a little bit of white discharge the today  She does shave her genital area and states that she has had lesions like this in the past   No vaginal involvement  Review of Systems   Constitutional: Negative for chills, diaphoresis, fatigue and fever  Respiratory: Negative for cough  Genitourinary: Negative for decreased urine volume, dysuria, genital sores, menstrual problem, pelvic pain, vaginal bleeding, vaginal discharge and vaginal pain  Skin: Positive for wound  Objective:     Physical Exam  Vitals reviewed  Constitutional:       Appearance: Normal appearance  Cardiovascular:      Rate and Rhythm: Normal rate  Pulmonary:      Effort: Pulmonary effort is normal    Abdominal:      Palpations: Abdomen is soft  Skin:     General: Skin is warm and dry  Comments: 0 5 centimetre circumferential pustule in the left vulvar area  Able to express a small amount of discharge, nonpurulent, sent for culture   Neurological:      Mental Status: She is alert

## 2021-08-26 LAB
BACTERIA SPEC AEROBE CULT: ABNORMAL
Lab: ABNORMAL

## 2021-08-31 LAB
25(OH)D3+25(OH)D2 SERPL-MCNC: 45.3 NG/ML (ref 30–100)
BUN SERPL-MCNC: 12 MG/DL (ref 6–24)
BUN/CREAT SERPL: 13 (ref 9–23)
CALCIUM SERPL-MCNC: 9.5 MG/DL (ref 8.7–10.2)
CHLORIDE SERPL-SCNC: 104 MMOL/L (ref 96–106)
CHOLEST SERPL-MCNC: 267 MG/DL (ref 100–199)
CO2 SERPL-SCNC: 25 MMOL/L (ref 20–29)
CREAT SERPL-MCNC: 0.93 MG/DL (ref 0.57–1)
ERYTHROCYTE [DISTWIDTH] IN BLOOD BY AUTOMATED COUNT: 12.9 % (ref 11.7–15.4)
GLUCOSE SERPL-MCNC: 85 MG/DL (ref 65–99)
HCT VFR BLD AUTO: 35.6 % (ref 34–46.6)
HCV AB S/CO SERPL IA: <0.1 S/CO RATIO (ref 0–0.9)
HDLC SERPL-MCNC: 73 MG/DL
HGB BLD-MCNC: 12.1 G/DL (ref 11.1–15.9)
HIV 1+2 AB+HIV1 P24 AG SERPL QL IA: NON REACTIVE
LDLC SERPL CALC-MCNC: 175 MG/DL (ref 0–99)
LDLC/HDLC SERPL: 2.4 RATIO (ref 0–3.2)
MCH RBC QN AUTO: 31 PG (ref 26.6–33)
MCHC RBC AUTO-ENTMCNC: 34 G/DL (ref 31.5–35.7)
MCV RBC AUTO: 91 FL (ref 79–97)
PLATELET # BLD AUTO: 181 X10E3/UL (ref 150–450)
POTASSIUM SERPL-SCNC: 4.4 MMOL/L (ref 3.5–5.2)
RBC # BLD AUTO: 3.9 X10E6/UL (ref 3.77–5.28)
SL AMB EGFR AFRICAN AMERICAN: 80 ML/MIN/1.73
SL AMB EGFR NON AFRICAN AMERICAN: 69 ML/MIN/1.73
SL AMB VLDL CHOLESTEROL CALC: 19 MG/DL (ref 5–40)
SODIUM SERPL-SCNC: 142 MMOL/L (ref 134–144)
TRIGL SERPL-MCNC: 112 MG/DL (ref 0–149)
WBC # BLD AUTO: 4.2 X10E3/UL (ref 3.4–10.8)

## 2021-09-13 ENCOUNTER — ANNUAL EXAM (OUTPATIENT)
Dept: FAMILY MEDICINE CLINIC | Facility: CLINIC | Age: 56
End: 2021-09-13
Payer: COMMERCIAL

## 2021-09-13 VITALS
WEIGHT: 98.6 LBS | DIASTOLIC BLOOD PRESSURE: 60 MMHG | RESPIRATION RATE: 18 BRPM | HEIGHT: 62 IN | TEMPERATURE: 97.3 F | SYSTOLIC BLOOD PRESSURE: 102 MMHG | BODY MASS INDEX: 18.14 KG/M2 | OXYGEN SATURATION: 97 % | HEART RATE: 89 BPM

## 2021-09-13 DIAGNOSIS — Z12.4 CERVICAL CANCER SCREENING: Primary | ICD-10-CM

## 2021-09-13 PROCEDURE — 3008F BODY MASS INDEX DOCD: CPT | Performed by: FAMILY MEDICINE

## 2021-09-13 PROCEDURE — 99396 PREV VISIT EST AGE 40-64: CPT | Performed by: FAMILY MEDICINE

## 2021-09-13 PROCEDURE — 1036F TOBACCO NON-USER: CPT | Performed by: FAMILY MEDICINE

## 2021-09-13 RX ORDER — SPIRONOLACTONE 50 MG/1
50 TABLET, FILM COATED ORAL DAILY
COMMUNITY
Start: 2021-08-31

## 2021-09-14 NOTE — PROGRESS NOTES
Maynor Lynch  55 y o   21      CC: Annual gyn  Care gaps addressed this visit:  Cervical cancer screening  Follow up:  Needs annual physical    Problem List Items Addressed This Visit     None      Visit Diagnoses     Cervical cancer screening    -  Primary    Relevant Orders    IGP, Aptima HPV           Subjective:         sexually active with new boyfriend-  Uses condoms  Last period in    history of D&C in  for abnormal uterine bleeding  She has had no bleeding or spotting since then  She notes mild itching since becoming sexually active again however she things this more related to vaginal dryness  Gynecologic Exam  The patient's primary symptoms include genital itching  The patient's pertinent negatives include no genital lesions, genital odor, genital rash, pelvic pain, vaginal bleeding or vaginal discharge  She is not pregnant  Associated symptoms include painful intercourse (vaginal dryness)  Pertinent negatives include no abdominal pain, anorexia, back pain, chills, constipation, diarrhea, discolored urine, dysuria, fever, flank pain, frequency, headaches, hematuria, joint pain, joint swelling, nausea, rash, sore throat, urgency or vomiting  She is sexually active  No, her partner does not have an STD  She uses condoms for contraception  She is postmenopausal  There is no history of an ectopic pregnancy, miscarriage, PID, an STD or a terminated pregnancy  Review of Systems   Constitutional: Negative for chills and fever  HENT: Negative for sore throat  Gastrointestinal: Negative for abdominal pain, anorexia, constipation, diarrhea, nausea and vomiting  Genitourinary: Negative for dysuria, flank pain, frequency, hematuria, pelvic pain, urgency and vaginal discharge  Musculoskeletal: Negative for back pain and joint pain  Skin: Negative for rash  Neurological: Negative for headaches          The following portions of the patient's history were reviewed and updated as appropriate:  current medications, past family history, past medical history, past social history, past surgical history and problem list     Current Outpatient Medications on File Prior to Visit   Medication Sig Dispense Refill    busPIRone (BUSPAR) 5 mg tablet Take 5 mg by mouth 2 (two) times a day       citalopram (CeleXA) 20 mg tablet Take 40 mg by mouth every evening       clonazePAM (KlonoPIN) 0 5 mg tablet take 1 tablet by mouth twice a day if needed (Patient taking differently: 0 75 mg 2 (two) times a day as needed for anxiety Takes 0 75mg BID) 60 tablet 5    multivitamin (THERAGRAN) TABS Take 1 tablet by mouth daily      nicotine (NICODERM CQ) 14 mg/24hr TD 24 hr patch place 1 patch ON THE SKIN every 24 hours 28 patch 3    traZODone (DESYREL) 50 mg tablet Take 1 tablet (50 mg total) by mouth daily at bedtime (Patient taking differently: Take 75 mg by mouth daily at bedtime ) 14 tablet 0    Azelaic Acid (FINACEA) 15 % cream Apply topically 2 (two) times a day (Patient not taking: Reported on 8/20/2021)      citalopram (CeleXA) 10 mg tablet Take 10 mg by mouth every evening (Patient not taking: Reported on 8/20/2021)      hydrOXYzine HCL (ATARAX) 25 mg tablet Take 1 tablet (25 mg total) by mouth every 6 (six) hours as needed for anxiety (Patient not taking: Reported on 8/20/2021) 30 tablet 0    megestrol (MEGACE) 40 mg tablet Take 40 mg by mouth 2 (two) times a day before meals before breakfast and dinner (Patient not taking: Reported on 8/20/2021)      spironolactone (ALDACTONE) 50 mg tablet Take 50 mg by mouth daily (Patient not taking: Reported on 9/13/2021)       No current facility-administered medications on file prior to visit          Objective:    /60   Pulse 89   Temp (!) 97 3 °F (36 3 °C) (Tympanic)   Resp 18   Ht 5' 2" (1 575 m)   Wt 44 7 kg (98 lb 9 6 oz)   LMP 10/01/2018   SpO2 97%   BMI 18 03 kg/m²   Wet mount: no yeast or clue cells    Physical Exam  Constitutional: General: She is not in acute distress  Appearance: She is not ill-appearing  Cardiovascular:      Rate and Rhythm: Normal rate and regular rhythm  Chest:      Chest wall: No deformity  Breasts:         Right: No tenderness  Left: No tenderness  Comments: Bilateral breast augmentation   Genitourinary:     Vagina: Normal  No vaginal discharge, tenderness or lesions  Cervix: No cervical motion tenderness, friability, erythema or eversion  Uterus: Normal        Adnexa: Right adnexa normal and left adnexa normal       Comments: Loss of vaginal rugae, mild atrophy noted  Lymphadenopathy:      Upper Body:      Right upper body: No supraclavicular, axillary or pectoral adenopathy  Left upper body: No supraclavicular, axillary or pectoral adenopathy  Neurological:      Mental Status: She is alert  Some portions of this record may have been generated with voice recognition software  There may be translation, syntax, or grammatical errors  Occasional wrong word or "sound-a-like" substitutions may have occurred due to the inherent limitations of the voice recognition software       [address] [facility]   [id number]

## 2021-09-16 LAB
CYTOLOGIST CVX/VAG CYTO: NORMAL
DX ICD CODE: NORMAL
HPV I/H RISK 4 DNA CVX QL PROBE+SIG AMP: NEGATIVE
OTHER STN SPEC: NORMAL
PATH REPORT.FINAL DX SPEC: NORMAL
SL AMB NOTE:: NORMAL
SL AMB SPECIMEN ADEQUACY: NORMAL
SL AMB TEST METHODOLOGY: NORMAL

## 2021-10-04 DIAGNOSIS — Z71.6 ENCOUNTER FOR SMOKING CESSATION COUNSELING: ICD-10-CM

## 2021-10-04 RX ORDER — NICOTINE 21 MG/24HR
1 PATCH, TRANSDERMAL 24 HOURS TRANSDERMAL EVERY 24 HOURS
Qty: 28 PATCH | Refills: 3 | Status: SHIPPED | OUTPATIENT
Start: 2021-10-04 | End: 2022-02-09

## 2021-10-11 ENCOUNTER — HOSPITAL ENCOUNTER (OUTPATIENT)
Dept: RADIOLOGY | Facility: HOSPITAL | Age: 56
Discharge: HOME | End: 2021-10-11
Payer: COMMERCIAL

## 2021-10-11 VITALS — BODY MASS INDEX: 18.03 KG/M2 | WEIGHT: 98 LBS | HEIGHT: 62 IN

## 2021-10-11 DIAGNOSIS — Z12.31 ENCOUNTER FOR SCREENING MAMMOGRAM FOR MALIGNANT NEOPLASM OF BREAST: ICD-10-CM

## 2021-10-11 PROCEDURE — 77067 SCR MAMMO BI INCL CAD: CPT

## 2021-10-11 PROCEDURE — 77063 BREAST TOMOSYNTHESIS BI: CPT

## 2022-02-09 DIAGNOSIS — Z71.6 ENCOUNTER FOR SMOKING CESSATION COUNSELING: ICD-10-CM

## 2022-02-09 RX ORDER — NICOTINE 21 MG/24HR
1 PATCH, TRANSDERMAL 24 HOURS TRANSDERMAL EVERY 24 HOURS
Qty: 28 PATCH | Refills: 3 | Status: SHIPPED | OUTPATIENT
Start: 2022-02-09 | End: 2022-06-06

## 2022-09-28 ENCOUNTER — OFFICE VISIT (OUTPATIENT)
Dept: FAMILY MEDICINE CLINIC | Facility: CLINIC | Age: 57
End: 2022-09-28
Payer: COMMERCIAL

## 2022-09-28 VITALS
BODY MASS INDEX: 18.66 KG/M2 | HEART RATE: 74 BPM | TEMPERATURE: 98.3 F | WEIGHT: 102 LBS | DIASTOLIC BLOOD PRESSURE: 62 MMHG | RESPIRATION RATE: 16 BRPM | SYSTOLIC BLOOD PRESSURE: 100 MMHG | OXYGEN SATURATION: 97 %

## 2022-09-28 DIAGNOSIS — Z12.31 ENCOUNTER FOR SCREENING MAMMOGRAM FOR MALIGNANT NEOPLASM OF BREAST: ICD-10-CM

## 2022-09-28 DIAGNOSIS — R22.32 LUMP IN ARMPIT, LEFT: ICD-10-CM

## 2022-09-28 DIAGNOSIS — Z12.11 SCREEN FOR COLON CANCER: Primary | ICD-10-CM

## 2022-09-28 PROCEDURE — 99213 OFFICE O/P EST LOW 20 MIN: CPT | Performed by: FAMILY MEDICINE

## 2022-10-14 ENCOUNTER — TELEPHONE (OUTPATIENT)
Dept: FAMILY MEDICINE CLINIC | Facility: CLINIC | Age: 57
End: 2022-10-14

## 2022-10-14 NOTE — TELEPHONE ENCOUNTER
Patient would like to get a letter excusing her from getting the flu shot  Patient states that she had an allergic reaction to the flu vaccine back in 2018 and does not want to get another  Patient needs this for work    Please fax to [phone]

## 2022-10-14 NOTE — LETTER
October 17, 2022     Patient: Thomas Youssef  YOB: 1965  Date of Visit: 10/14/2022      To Whom it May Concern:    Thomas Youssef is under my professional care  Catalina Ocampo reports that she has an allergic reaction to the influenza vaccine and is thus unable to get the vaccine  If you have any questions or concerns, please don't hesitate to call           Sincerely,          Victorino Boas          CC: No Recipients

## 2022-10-17 NOTE — TELEPHONE ENCOUNTER
Reports only had flu shot in 2019, got sick for 12 days  No episodes of anaphylaxis or dysphagia/dyspnea  Reports this was the only time she had the flu shot in her lifetime and got flu after  Thinks she went to a walk in facility  On chart review there is no mentioned of a flu vaccine allergy  A [facility] encounter on 2/13/19 states "flu like symptoms" as diagnosis  Rapid flu test on 2/14/19 was negative  Spoke with pt and discussed that there is no documented true allergy to the flu vaccine  She may have gotten a type of flu infections while she got the vaccine in 2019 which may have caused a prolonged infection  Discussed with pt that I can provide a letter that states that "pt reports influenza vaccine allergy" but I can not state it as a medical fact  Pt understands and is ok with me providing a letter that states that  She stated "I would rather quit my job than get the shot again" and will check with work if that letter is adequate  Offered allergy referral to get tested for influenza vaccine allergy- pt will consider depending on her conversation with employee  Reports no allergies on eggs or other vaccines

## 2022-10-27 NOTE — PROGRESS NOTES
[facility] Office visit    Assessment/Plan:     1  Screen for colon cancer  -     Ambulatory Referral to Gastroenterology; Future; Expected date: 09/29/2022    2  Encounter for screening mammogram for malignant neoplasm of breast  -     Mammo screening bilateral w 3d & cad; Future; Expected date: 09/28/2022    3  Lump in armpit, left     - follow up with routing mammogram screen to further characterize lump     Return in about 1 month (around 10/28/2022) for Annual physical      Subjective:   HPI  Delaine Baumgarten is a 65 y o  female who presents with concern for lump on her left armpit near her breast which she noticed about a month ago and would like to have imaged  She denies the it was every tender, warm or discolored  Review of Systems   Constitutional: Negative for chills, diaphoresis, fatigue, fever and unexpected weight change  Respiratory: Negative for shortness of breath  Cardiovascular: Negative for chest pain  Musculoskeletal: Negative for arthralgias  Skin: Negative for rash  Hematological: Negative for adenopathy  Objective:     /62   Pulse 74   Temp 98 3 °F (36 8 °C)   Resp 16   Wt 46 3 kg (102 lb)   LMP 10/01/2018   SpO2 97%   BMI 18 66 kg/m²      Physical Exam  Constitutional:       General: She is not in acute distress  Appearance: Normal appearance  She is normal weight  She is not ill-appearing, toxic-appearing or diaphoretic  Cardiovascular:      Rate and Rhythm: Normal rate and regular rhythm  Heart sounds: Normal heart sounds  No murmur heard  Pulmonary:      Effort: Pulmonary effort is normal       Breath sounds: Normal breath sounds  Musculoskeletal:      Comments: Non-tender, mobile lump at left armpit near breast without erythema or discharge  Neurological:      Mental Status: She is alert and oriented to person, place, and time            ** Please Note: This note has been constructed using a voice recognition system ** Rica Sapp  10/26/22  10:59 PM

## 2022-11-28 ENCOUNTER — TELEPHONE (OUTPATIENT)
Dept: FAMILY MEDICINE CLINIC | Facility: CLINIC | Age: 57
End: 2022-11-28

## 2022-11-28 DIAGNOSIS — Z71.6 ENCOUNTER FOR SMOKING CESSATION COUNSELING: Primary | ICD-10-CM

## 2022-11-28 RX ORDER — NICOTINE 21 MG/24HR
1 PATCH, TRANSDERMAL 24 HOURS TRANSDERMAL EVERY 24 HOURS
Qty: 28 PATCH | Refills: 1 | Status: SHIPPED | OUTPATIENT
Start: 2022-11-28

## 2022-11-28 NOTE — TELEPHONE ENCOUNTER
Sent 1 month with 1 refill  Pt due for annual physical- pls call to schedule so we can continue refills  Thank you!

## 2022-12-02 ENCOUNTER — HOSPITAL ENCOUNTER (OUTPATIENT)
Dept: RADIOLOGY | Facility: HOSPITAL | Age: 57
Discharge: HOME | End: 2022-12-02

## 2022-12-02 DIAGNOSIS — N63.0 BREAST LUMP: ICD-10-CM

## 2022-12-02 DIAGNOSIS — Z12.31 ENCOUNTER FOR SCREENING MAMMOGRAM FOR MALIGNANT NEOPLASM OF BREAST: ICD-10-CM

## 2023-01-10 ENCOUNTER — OFFICE VISIT (OUTPATIENT)
Dept: FAMILY MEDICINE CLINIC | Facility: CLINIC | Age: 58
End: 2023-01-10

## 2023-01-10 VITALS
BODY MASS INDEX: 18.62 KG/M2 | WEIGHT: 101.2 LBS | SYSTOLIC BLOOD PRESSURE: 98 MMHG | TEMPERATURE: 98.3 F | DIASTOLIC BLOOD PRESSURE: 62 MMHG | OXYGEN SATURATION: 98 % | HEART RATE: 74 BPM | HEIGHT: 62 IN

## 2023-01-10 DIAGNOSIS — J02.9 VIRAL PHARYNGITIS: Primary | ICD-10-CM

## 2023-01-10 NOTE — PROGRESS NOTES
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    The following portions of the patient's history were reviewed and updated as appropriate: allergies, current medications, past family history, past medical history, past social history, past surgical history, and problem list     Review of Systems   Constitutional: Negative for chills and fever  HENT: Positive for congestion, postnasal drip, rhinorrhea and sore throat  Negative for ear pain  Eyes: Negative for pain and visual disturbance  Respiratory: Positive for cough  Negative for chest tightness and shortness of breath  Cardiovascular: Negative for chest pain and palpitations  Gastrointestinal: Negative for abdominal pain, constipation, diarrhea, nausea and vomiting  Genitourinary: Negative for dysuria, hematuria and menstrual problem  Musculoskeletal: Negative for arthralgias, back pain and myalgias  Skin: Negative for color change and rash  Neurological: Negative for seizures and syncope  Psychiatric/Behavioral: Negative for dysphoric mood and suicidal ideas  All other systems reviewed and are negative  Objective:      BP 98/62 (BP Location: Left arm, Patient Position: Sitting, Cuff Size: Adult)   Pulse 74   Temp 98 3 °F (36 8 °C) (Tympanic)   Ht 5' 2" (1 575 m)   Wt 45 9 kg (101 lb 3 2 oz)   LMP 10/01/2018   SpO2 98%   BMI 18 51 kg/m²          Physical Exam  Constitutional:       Appearance: Normal appearance  HENT:      Head: Normocephalic and atraumatic  Right Ear: Tympanic membrane and ear canal normal  No middle ear effusion  Tympanic membrane is not erythematous  Left Ear: Tympanic membrane and ear canal normal   No middle ear effusion  Tympanic membrane is not erythematous  Nose: No congestion or rhinorrhea  Mouth/Throat:      Mouth: Mucous membranes are moist       Pharynx: Oropharynx is clear  No oropharyngeal exudate or posterior oropharyngeal erythema  Eyes:      Extraocular Movements:      Right eye: Normal extraocular motion  Left eye: Normal extraocular motion  Conjunctiva/sclera: Conjunctivae normal       Pupils: Pupils are equal, round, and reactive to light  Cardiovascular:      Rate and Rhythm: Normal rate and regular rhythm  Pulses: Normal pulses  Heart sounds: Normal heart sounds  No murmur heard  Pulmonary:      Effort: Pulmonary effort is normal  No respiratory distress  Breath sounds: Normal breath sounds  No wheezing  Abdominal:      General: Bowel sounds are normal  There is no distension  Palpations: Abdomen is soft  Tenderness: There is no abdominal tenderness  Musculoskeletal:      Cervical back: Normal range of motion  Right lower leg: No edema  Left lower leg: No edema  Lymphadenopathy:      Cervical: No cervical adenopathy  Skin:     General: Skin is warm and dry  Neurological:      General: No focal deficit present  Mental Status: She is alert and oriented to person, place, and time     Psychiatric:         Mood and Affect: Mood normal          Behavior: Behavior normal

## 2023-01-12 ENCOUNTER — TELEPHONE (OUTPATIENT)
Dept: FAMILY MEDICINE CLINIC | Facility: CLINIC | Age: 58
End: 2023-01-12

## 2023-01-12 DIAGNOSIS — J02.9 VIRAL PHARYNGITIS: Primary | ICD-10-CM

## 2023-01-12 RX ORDER — BENZONATATE 100 MG/1
100 CAPSULE ORAL 3 TIMES DAILY PRN
Qty: 20 CAPSULE | Refills: 1 | Status: SHIPPED | OUTPATIENT
Start: 2023-01-12

## 2023-01-12 NOTE — TELEPHONE ENCOUNTER
Pt reports increasing congestion but has not tried anything  More productive cough  Waking up with sweats last 3 nights but no fever  Will send tessalon perles for cough  Instructed pt to take mucinex for congestion  If no resolution in 1 week, RTC for re-assessment

## 2023-01-12 NOTE — TELEPHONE ENCOUNTER
Dr Ayush Angulo,    Patient saw you a couple of days ago for a sore throat  Patient states she is not getting any better and would like to know if you can prescribe something for her or do you want to see her again to re evaluate? Patient states she has been breaking out in a sweat during the night but not running fever and also has developed a slight cough  Pharmacy is [facility] in [place]    Please advise

## 2023-01-24 DIAGNOSIS — Z71.6 ENCOUNTER FOR SMOKING CESSATION COUNSELING: ICD-10-CM

## 2023-02-13 ENCOUNTER — OFFICE VISIT (OUTPATIENT)
Dept: FAMILY MEDICINE CLINIC | Facility: CLINIC | Age: 58
End: 2023-02-13

## 2023-02-13 VITALS
OXYGEN SATURATION: 99 % | TEMPERATURE: 97.7 F | WEIGHT: 103.25 LBS | HEART RATE: 76 BPM | BODY MASS INDEX: 19 KG/M2 | HEIGHT: 62 IN | SYSTOLIC BLOOD PRESSURE: 108 MMHG | DIASTOLIC BLOOD PRESSURE: 62 MMHG | RESPIRATION RATE: 21 BRPM

## 2023-02-13 DIAGNOSIS — Z00.01 ANNUAL VISIT FOR GENERAL ADULT MEDICAL EXAMINATION WITH ABNORMAL FINDINGS: Primary | ICD-10-CM

## 2023-02-13 DIAGNOSIS — L65.9 HAIR LOSS: ICD-10-CM

## 2023-02-13 DIAGNOSIS — E78.2 MIXED HYPERLIPIDEMIA: ICD-10-CM

## 2023-02-13 DIAGNOSIS — Z12.2 ENCOUNTER FOR SCREENING FOR LUNG CANCER: ICD-10-CM

## 2023-02-13 DIAGNOSIS — R92.2 DENSE BREAST TISSUE ON MAMMOGRAM: ICD-10-CM

## 2023-02-13 DIAGNOSIS — R46.81 OBSESSIVE-COMPULSIVE BEHAVIOR: ICD-10-CM

## 2023-02-13 DIAGNOSIS — Z12.11 ENCOUNTER FOR COLORECTAL CANCER SCREENING: ICD-10-CM

## 2023-02-13 DIAGNOSIS — Z12.12 ENCOUNTER FOR COLORECTAL CANCER SCREENING: ICD-10-CM

## 2023-02-13 DIAGNOSIS — Z12.39 ENCOUNTER FOR BREAST CANCER SCREENING USING NON-MAMMOGRAM MODALITY: ICD-10-CM

## 2023-02-13 DIAGNOSIS — Z87.891 HISTORY OF NICOTINE DEPENDENCE: ICD-10-CM

## 2023-02-13 RX ORDER — MULTIVITAMIN WITH IRON
1 TABLET ORAL DAILY
COMMUNITY

## 2023-02-13 RX ORDER — CHLORAL HYDRATE 500 MG
1000 CAPSULE ORAL DAILY
COMMUNITY

## 2023-02-13 NOTE — PROGRESS NOTES
[address]    NAME: John Hampton  AGE: 65 y o  SEX: female  : 1965     DATE: 2023     Assessment and Plan:     1  Annual visit for general adult medical examination with abnormal findings    Preventive Services  • Colorectal Cancer Screening: due, no fh or personal hx of clolon cancer of polyps  Discussed that colonoscopy ideal screening but pt hesitant and no contraindication to cologard so ordered  • Breast Cancer Screening: mammogram normal  • Cervica Cancer Screening: neg cotesting   • Lung Cancer Screening: due  • STD Screening: NI  • Cardiovascular Screening: due  • Vaccination indicated: refused flu and shingrix     2  Hair loss   Will check TSH for hair loss  - TSH, 3rd generation with Free T4 reflex; Future  - TSH, 3rd generation with Free T4 reflex    3  Mixed hyperlipidemia  Recheck lipid as last LDL elevated  Pt hesitant of pharmacotherapy if indicated  Discussed trying red rice yeast extract if needed  - Lipid Panel with Direct LDL reflex; Future  - Comprehensive metabolic panel; Future  - Lipid Panel with Direct LDL reflex  - Comprehensive metabolic panel    4  Obsessive-compulsive behavior  Follows with family guidance with psychiatrist and therapist  Stable  Continue current management  5  History of nicotine dependence  Has not smoked in 3 years but hesitant to stop NRT due to fear of recurrence  Discussed weaning off so will switch from 14mg/day to 7mg/day    6  Encounter for screening for lung cancer  I discussed with her that she is a candidate for lung cancer CT screening  The following Shared Decision-Making points were covered:  1  Benefits of screening were discussed, including the rates of reduction in death from lung cancer and other causes    Harms of screening were reviewed, including false positive tests, radiation exposure levels, risks of invasive procedures, risks of complications of screening, and risk of overdiagnosis  2  I counseled on the importance of adherence to annual lung cancer LDCT screening, impact of co-morbidities, and ability or willingness to undergo diagnosis and treatment  3  I counseled on the importance of maintaining abstinence as a former smoker or was counseled on the importance of smoking cessation if a current smoker    Review of Eligibility Criteria: She meets all of the criteria for Lung Cancer Screening  · She is 65 y o  · She has 20 pack year tobacco history and is a current smoker or has quit within the past 15 years  · She presents no signs or symptoms of lung cancer    After discussion, the patient decided to elect lung cancer screening   - CT lung screening program; Future    7  Encounter for colorectal cancer screening  - Cologuard    8  Dense breast tissue on mammogram  - US breast screening bilateral complete (ABUS); Future    9  Encounter for breast cancer screening using non-mammogram modality  - US breast screening bilateral complete (ABUS); Future      Immunizations and preventive care screenings were discussed with patient today  Appropriate education was printed on patient's after visit summary  Counseling:  Alcohol/drug use: discussed moderation in alcohol intake, the recommendations for healthy alcohol use, and avoidance of illicit drug use  Dental Health: discussed importance of regular tooth brushing, flossing, and dental visits  Injury prevention: discussed safety/seat belts, safety helmets, smoke detectors, carbon dioxide detectors, and smoking near bedding or upholstery  Sexual health: discussed sexually transmitted diseases, partner selection, use of condoms, avoidance of unintended pregnancy, and contraceptive alternatives  · Exercise: the importance of regular exercise/physical activity was discussed  Recommend exercise 3-5 times per week for at least 30 minutes  No follow-ups on file       Chief Complaint:     Chief Complaint   Patient presents with   • Physical Exam     She wants to discuss what she can do for hairloss      History of Present Illness:     Adult Annual Physical   Patient here for a comprehensive physical exam    - goes to [facility] for management of mental health, sees therapist/group therapy every week     Diet and Physical Activity  · Diet/Nutrition: well balanced diet, limited junk food and consuming 3-5 servings of fruits/vegetables daily  · Exercise: strength training exercises and 1-2 times a week on average  Depression Screening  PHQ-2/9 Depression Screening    Little interest or pleasure in doing things: 0 - not at all  Feeling down, depressed, or hopeless: 0 - not at all  Trouble falling or staying asleep, or sleeping too much: 1 - several days  Feeling tired or having little energy: 0 - not at all  Poor appetite or overeatin - not at all  Feeling bad about yourself - or that you are a failure or have let yourself or your family down: 0 - not at all  Trouble concentrating on things, such as reading the newspaper or watching television: 0 - not at all  Moving or speaking so slowly that other people could have noticed  Or the opposite - being so fidgety or restless that you have been moving around a lot more than usual: 0 - not at all  Thoughts that you would be better off dead, or of hurting yourself in some way: 0 - not at all  PHQ-9 Score: 1   PHQ-9 Interpretation: No or Minimal depression        RUSS-7 Flowsheet Screening    Flowsheet Row Most Recent Value   Over the last 2 weeks, how often have you been bothered by any of the following problems?     Feeling nervous, anxious, or on edge 0   Not being able to stop or control worrying 0   Worrying too much about different things 0   Trouble relaxing 0   Being so restless that it is hard to sit still 0   Becoming easily annoyed or irritable 0   Feeling afraid as if something awful might happen 0   RUSS-7 Total Score 0          General Health  · Sleep: sleeps well and gets 4-6 hours of sleep on average  · Hearing: normal - bilateral   · Vision: most recent eye exam >1 year ago and wears glasses  · Dental: regular dental visits  /GYN Health  · Patient is: postmenopausal  · No PMB  · Sexually active seldom with long term male partner  · Contraceptive method: barrier methods   Health  · Symptoms include: none  •      Review of Systems:     Review of Systems   Constitutional: Negative for chills and fever  HENT: Negative for ear pain and sore throat  Eyes: Negative for pain and visual disturbance  Respiratory: Negative for cough, chest tightness and shortness of breath  Cardiovascular: Negative for chest pain and palpitations  Gastrointestinal: Negative for abdominal pain, constipation, diarrhea, nausea and vomiting  Genitourinary: Negative for dysuria, hematuria and menstrual problem  Musculoskeletal: Negative for arthralgias and back pain  Skin: Negative for color change and rash  Neurological: Negative for seizures and syncope  Psychiatric/Behavioral: Negative for dysphoric mood and suicidal ideas  All other systems reviewed and are negative  Past Medical History:     Past Medical History:   Diagnosis Date   • Anxiety    • Depression    • Eczema    • Lyme disease       Past Surgical History:     Past Surgical History:   Procedure Laterality Date   • AUGMENTATION MAMMAPLASTY Bilateral 1990      Social History:        Social History     Socioeconomic History   • Marital status:      Spouse name: None   • Number of children: None   • Years of education: None   • Highest education level: None   Occupational History   • None   Tobacco Use   • Smoking status: Former     Packs/day: 1 00     Years: 30 00     Pack years: 30 00     Types: Cigarettes     Quit date: 10/20/2019     Years since quitting: 3 3   • Smokeless tobacco: Never   Substance and Sexual Activity   • Alcohol use:  Yes     Alcohol/week: 1 0 standard drink Types: 1 Glasses of wine per week     Comment: socially on rare occasion    • Drug use: No   • Sexual activity: Not Currently   Other Topics Concern   • None   Social History Narrative   • None     Social Determinants of Health     Financial Resource Strain: Not on file   Food Insecurity: Not on file   Transportation Needs: Not on file   Physical Activity: Not on file   Stress: Not on file   Social Connections: Not on file   Intimate Partner Violence: Not on file   Housing Stability: Not on file      Family History:     Family History   Problem Relation Age of Onset   • Diabetes Mother    • Hyperlipidemia Father    • No Known Problems Sister    • Breast cancer Paternal Grandmother 72   • No Known Problems Sister    • Breast cancer Paternal Aunt 48   • Cancer Paternal Uncle       Current Medications:     Current Outpatient Medications   Medication Sig Dispense Refill   • B Complex-C (B-complex with vitamin C) tablet Take 1 tablet by mouth daily     • busPIRone (BUSPAR) 5 mg tablet Take 5 mg by mouth 2 (two) times a day      • citalopram (CeleXA) 20 mg tablet Take 40 mg by mouth every evening      • clonazePAM (KlonoPIN) 0 5 mg tablet take 1 tablet by mouth twice a day if needed (Patient taking differently: 0 75 mg 2 (two) times a day as needed for anxiety Takes 0 75mg BID) 60 tablet 5   • multivitamin (THERAGRAN) TABS Take 1 tablet by mouth daily     • nicotine (NICODERM CQ) 14 mg/24hr TD 24 hr patch Place 1 patch on the skin every 24 hours 28 patch 1   • NON FORMULARY Hair, skin and nails     • Omega-3 Fatty Acids (fish oil) 1,000 mg Take 1,000 mg by mouth daily     • traZODone (DESYREL) 50 mg tablet Take 1 tablet (50 mg total) by mouth daily at bedtime (Patient taking differently: Take 75 mg by mouth daily at bedtime) 14 tablet 0   • benzonatate (TESSALON PERLES) 100 mg capsule Take 1 capsule (100 mg total) by mouth 3 (three) times a day as needed for cough (Patient not taking: Reported on 2/13/2023) 20 capsule 1     No current facility-administered medications for this visit  Allergies:     No Known Allergies   Physical Exam:     /62 (BP Location: Left arm, Patient Position: Sitting, Cuff Size: Standard)   Pulse 76   Temp 97 7 °F (36 5 °C) (Temporal)   Resp 21   Ht 5' 2" (1 575 m)   Wt 46 8 kg (103 lb 4 oz)   LMP 10/01/2018   SpO2 99%   BMI 18 88 kg/m²     Physical Exam  Vitals and nursing note reviewed  Constitutional:       General: She is not in acute distress  Appearance: She is well-developed  HENT:      Head: Normocephalic and atraumatic  Right Ear: Tympanic membrane, ear canal and external ear normal  There is no impacted cerumen  Left Ear: Tympanic membrane, ear canal and external ear normal  There is no impacted cerumen  Nose: Nose normal  No rhinorrhea  Mouth/Throat:      Mouth: Mucous membranes are moist       Pharynx: Oropharynx is clear  No oropharyngeal exudate  Eyes:      Extraocular Movements: Extraocular movements intact  Conjunctiva/sclera: Conjunctivae normal       Pupils: Pupils are equal, round, and reactive to light  Cardiovascular:      Rate and Rhythm: Normal rate and regular rhythm  Heart sounds: No murmur heard  Pulmonary:      Effort: Pulmonary effort is normal  No respiratory distress  Breath sounds: Normal breath sounds  No wheezing  Abdominal:      General: Bowel sounds are normal  There is no distension  Palpations: Abdomen is soft  There is no mass  Tenderness: There is no abdominal tenderness  Hernia: No hernia is present  Musculoskeletal:         General: Normal range of motion  Cervical back: Normal range of motion and neck supple  Right lower leg: No edema  Left lower leg: No edema  Lymphadenopathy:      Cervical: No cervical adenopathy  Skin:     General: Skin is warm and dry  Capillary Refill: Capillary refill takes less than 2 seconds     Neurological:      General: No focal deficit present  Mental Status: She is alert and oriented to person, place, and time     Psychiatric:         Mood and Affect: Mood normal          Behavior: Behavior normal           Sandrita Avina, DO  [address]

## 2023-02-19 LAB
ALBUMIN SERPL-MCNC: 4.1 G/DL (ref 3.8–4.9)
ALBUMIN/GLOB SERPL: 1.9 {RATIO} (ref 1.2–2.2)
ALP SERPL-CCNC: 53 IU/L (ref 44–121)
ALT SERPL-CCNC: 17 IU/L (ref 0–32)
AST SERPL-CCNC: 25 IU/L (ref 0–40)
BILIRUB SERPL-MCNC: 0.4 MG/DL (ref 0–1.2)
BUN SERPL-MCNC: 8 MG/DL (ref 6–24)
BUN/CREAT SERPL: 10 (ref 9–23)
CALCIUM SERPL-MCNC: 9 MG/DL (ref 8.7–10.2)
CHLORIDE SERPL-SCNC: 100 MMOL/L (ref 96–106)
CHOLEST SERPL-MCNC: 251 MG/DL (ref 100–199)
CO2 SERPL-SCNC: 25 MMOL/L (ref 20–29)
CREAT SERPL-MCNC: 0.8 MG/DL (ref 0.57–1)
EGFR: 86 ML/MIN/1.73
GLOBULIN SER-MCNC: 2.2 G/DL (ref 1.5–4.5)
GLUCOSE SERPL-MCNC: 107 MG/DL (ref 70–99)
HDLC SERPL-MCNC: 80 MG/DL
LDLC SERPL CALC-MCNC: 159 MG/DL (ref 0–99)
LDLC/HDLC SERPL: 2 RATIO (ref 0–3.2)
POTASSIUM SERPL-SCNC: 4.4 MMOL/L (ref 3.5–5.2)
PROT SERPL-MCNC: 6.3 G/DL (ref 6–8.5)
SL AMB VLDL CHOLESTEROL CALC: 12 MG/DL (ref 5–40)
SODIUM SERPL-SCNC: 136 MMOL/L (ref 134–144)
TRIGL SERPL-MCNC: 75 MG/DL (ref 0–149)
TSH SERPL DL<=0.005 MIU/L-ACNC: 0.61 UIU/ML (ref 0.45–4.5)

## 2023-02-28 LAB — COLOGUARD RESULT REPORTABLE: NEGATIVE

## 2023-05-16 ENCOUNTER — ANNUAL EXAM (OUTPATIENT)
Dept: FAMILY MEDICINE CLINIC | Facility: CLINIC | Age: 58
End: 2023-05-16

## 2023-05-16 VITALS
OXYGEN SATURATION: 96 % | HEIGHT: 62 IN | BODY MASS INDEX: 19.43 KG/M2 | DIASTOLIC BLOOD PRESSURE: 62 MMHG | WEIGHT: 105.6 LBS | SYSTOLIC BLOOD PRESSURE: 92 MMHG | RESPIRATION RATE: 18 BRPM | HEART RATE: 58 BPM

## 2023-05-16 DIAGNOSIS — J30.1 SEASONAL ALLERGIC RHINITIS DUE TO POLLEN: ICD-10-CM

## 2023-05-16 DIAGNOSIS — Z01.419 ENCOUNTER FOR ANNUAL ROUTINE GYNECOLOGICAL EXAMINATION: Primary | ICD-10-CM

## 2023-05-16 DIAGNOSIS — N85.02 COMPLEX ATYPICAL ENDOMETRIAL HYPERPLASIA: ICD-10-CM

## 2023-05-16 DIAGNOSIS — Z87.42 HX OF ABNORMAL CERVICAL PAP SMEAR: ICD-10-CM

## 2023-05-16 PROBLEM — N93.9 ABNORMAL UTERINE BLEEDING (AUB): Status: RESOLVED | Noted: 2020-09-24 | Resolved: 2023-05-16

## 2023-05-16 RX ORDER — CETIRIZINE HYDROCHLORIDE 10 MG/1
10 TABLET ORAL DAILY
Qty: 30 TABLET | Refills: 2 | Status: SHIPPED | OUTPATIENT
Start: 2023-05-16

## 2023-05-16 NOTE — PROGRESS NOTES
Denys Segal is a 65 y o  female who presents for annual well woman exam      GYN:  · denies vaginal discharge, labial erythema or lesions, dyspareunia, intermenstrual bleeding, spotting, or discharge  · Menopause: age 47  · Postmenopausal bleeding? Not currently, prior hx with D&C - pathology showed proliferative endometrium with focal complex atypical hyperplasia   · Patient is sexually active with male partner (long term)  · Prior gynecologic surgeries: D&C    OB:  ·  female  · Pregnancies were uncomplicated  :  · Denies dysuria, urinary frequency or urgency, hematuria, flank pain, incontinence  Breast:  · Denies breast mass, skin changes, dimpling, reddening, nipple retraction, breast discharge  · Patient does not have a family history of endometrial, or ovarian ca  · Paternal GM and aunt had breast cancer after age 48    Lifestyle:  · Exercise: walking and yoga  Review of Systems  Pertinent items are noted in HPI  Objective      LMP 10/01/2018     Physical Exam  Exam conducted with a chaperone present (KEYSHA Arriaza)  Constitutional:       Appearance: Normal appearance  HENT:      Head: Normocephalic and atraumatic  Cardiovascular:      Rate and Rhythm: Normal rate  Pulmonary:      Effort: Pulmonary effort is normal  No respiratory distress  Breath sounds: No wheezing  Chest:      Chest wall: No mass, lacerations, deformity, swelling, tenderness, crepitus or edema  There is no dullness to percussion  Breasts: Wil Score is 5  Breasts are symmetrical       Right: Normal  No swelling, bleeding, inverted nipple, mass, nipple discharge, skin change or tenderness  Left: Normal  No swelling, bleeding, inverted nipple, mass, nipple discharge, skin change or tenderness  Genitourinary:     Exam position: Lithotomy position  Pubic Area: No rash or pubic lice  Wil stage (genital): 5        Labia:         Right: No rash, tenderness, Is This A New Presentation, Or A Follow-Up?: Skin Lesion How Severe Is Your Skin Lesion?: moderate lesion or injury  Left: No rash, tenderness, lesion or injury  Urethra: No prolapse, urethral pain, urethral swelling or urethral lesion  Vagina: Normal       Cervix: Normal       Uterus: Normal        Adnexa: Right adnexa normal and left adnexa normal       Comments: Atrophy noted over vagina and cervix  Lymphadenopathy:      Upper Body:      Right upper body: No supraclavicular, axillary or pectoral adenopathy  Left upper body: No supraclavicular, axillary or pectoral adenopathy  Skin:     General: Skin is warm and dry  Neurological:      General: No focal deficit present  Mental Status: She is alert and oriented to person, place, and time  Psychiatric:         Mood and Affect: Mood normal          Behavior: Behavior normal                  Assessment & Plan:   1  Encounter for annual routine gynecological examination  Exam normal except mild atrophic changes noted on exam  Discussed OTC vaginal moisturizers for relief as needed  Screening/Preventative:  · Cervical cancer: last pap smear in 2021  · Breast cancer: last mammogram in 12/22  Results were normal  Dense breasts noted- ABUS ordered at last visit but declines at this time  Repeat mammogram in 1 year  · Colon cancer: last cologard in 2/23  Results were neg, repeat in 3 years  Discussed self breast awareness  Discussed preventive care such as calcium and vitamin D supplementation, importance of weight bearing at least 3x a week to improve muscle mass  2  Hx of abnormal cervical Pap smear  Most recent pap in 2021 neg cotesting  Prior abnormal pap requiring cone biopsy in 35s 2019 pap showed ASCUS with HR HPV 16/18    3  Complex atypical endometrial hyperplasia  D&C in 2020 which showed proliferative endometrium with focal complex atypical hyperplasia  No postmenopausal bleeding anymore     4  Seasonal allergic rhinitis due to pollen  - cetirizine (ZyrTEC) 10 mg tablet;  Take 1 tablet (10 mg total) by mouth Has Your Skin Lesion Been Treated?: not been treated daily  Dispense: 30 tablet;  Refill: 2 1

## 2023-05-22 DIAGNOSIS — Z71.6 ENCOUNTER FOR SMOKING CESSATION COUNSELING: ICD-10-CM

## 2023-06-01 ENCOUNTER — OFFICE VISIT (OUTPATIENT)
Dept: URGENT CARE | Facility: CLINIC | Age: 58
End: 2023-06-01

## 2023-06-01 VITALS
TEMPERATURE: 98.4 F | BODY MASS INDEX: 19.02 KG/M2 | WEIGHT: 104 LBS | RESPIRATION RATE: 14 BRPM | HEART RATE: 79 BPM | OXYGEN SATURATION: 98 %

## 2023-06-01 DIAGNOSIS — R68.89 ITCHY EYES: Primary | ICD-10-CM

## 2023-06-01 RX ORDER — KETOTIFEN FUMARATE 0.35 MG/ML
1 SOLUTION/ DROPS OPHTHALMIC 2 TIMES DAILY
Qty: 5 ML | Refills: 0 | Status: SHIPPED | OUTPATIENT
Start: 2023-06-01

## 2023-06-01 NOTE — PROGRESS NOTES
[facility]        NAME: Bety Foss is a 65 y o  female  : 1965    MRN: [id number]  DATE: 2023  TIME: 6:52 PM    Assessment and Plan   Itchy eyes [R68 89]  1  Itchy eyes  ketotifen (ZADITOR) 0 025 % ophthalmic solution        allergy drops for 1 week       Patient Instructions   There are no Patient Instructions on file for this visit  Follow up with PCP in 3-5 days  Proceed to  ER if symptoms worsen  Chief Complaint     Chief Complaint   Patient presents with   • Eye Problem     Pt presents with eye itching, discharge; started this morning         History of Present Illness       The patient is a 41-year-old female presenting today for eye itching  She reports the eyes feel sticky  No discharge or pain  She does have seasonal allergies but this does not feel the same  Review of Systems   Review of Systems   Constitutional: Negative for activity change, appetite change, chills, fatigue and fever  HENT: Negative for congestion, ear pain, rhinorrhea, sinus pressure, sinus pain and sore throat  Eyes: Positive for itching  Negative for pain, discharge, redness and visual disturbance  Respiratory: Negative for cough, chest tightness and shortness of breath  Cardiovascular: Negative for chest pain and palpitations  Gastrointestinal: Negative for abdominal pain, diarrhea, nausea and vomiting  Genitourinary: Negative for dysuria and hematuria  Musculoskeletal: Negative for arthralgias, back pain and myalgias  Skin: Negative for color change, pallor and rash  Neurological: Negative for seizures, syncope and headaches  All other systems reviewed and are negative          Current Medications       Current Outpatient Medications:   •  B Complex-C (B-complex with vitamin C) tablet, Take 1 tablet by mouth daily, Disp: , Rfl:   •  busPIRone (BUSPAR) 5 mg tablet, Take 5 mg by mouth daily as needed, Disp: , Rfl:   •  cetirizine (ZyrTEC) 10 mg tablet, Take 1 tablet (10 mg total) by mouth daily, Disp: 30 tablet, Rfl: 2  •  citalopram (CeleXA) 20 mg tablet, Take 40 mg by mouth every evening , Disp: , Rfl:   •  clonazePAM (KlonoPIN) 0 5 mg tablet, take 1 tablet by mouth twice a day if needed, Disp: 60 tablet, Rfl: 5  •  ketotifen (ZADITOR) 0 025 % ophthalmic solution, Administer 1 drop to both eyes 2 (two) times a day, Disp: 5 mL, Rfl: 0  •  multivitamin (THERAGRAN) TABS, Take 1 tablet by mouth daily, Disp: , Rfl:   •  nicotine (NICODERM CQ) 7 mg/24hr TD 24 hr patch, Place 1 patch on the skin over 24 hours every 24 hours, Disp: 28 patch, Rfl: 2  •  Omega-3 Fatty Acids (fish oil) 1,000 mg, Take 1,000 mg by mouth daily, Disp: , Rfl:   •  traZODone (DESYREL) 50 mg tablet, Take 1 tablet (50 mg total) by mouth daily at bedtime (Patient taking differently: Take 25 mg by mouth daily at bedtime as needed), Disp: 14 tablet, Rfl: 0  •  NON FORMULARY, Hair, skin and nails (Patient not taking: Reported on 6/1/2023), Disp: , Rfl:     Current Allergies     Allergies as of 06/01/2023   • (No Known Allergies)            The following portions of the patient's history were reviewed and updated as appropriate: allergies, current medications, past family history, past medical history, past social history, past surgical history and problem list      Past Medical History:   Diagnosis Date   • Anxiety    • Depression    • Eczema    • Lyme disease        Past Surgical History:   Procedure Laterality Date   • AUGMENTATION MAMMAPLASTY Bilateral 1990       Family History   Problem Relation Age of Onset   • Diabetes Mother    • Hyperlipidemia Father    • No Known Problems Sister    • No Known Problems Sister    • Breast cancer Paternal Aunt 48   • Cancer Paternal Uncle    • Breast cancer Paternal Grandmother 72         Medications have been verified          Objective   Pulse 79   Temp 98 4 °F (36 9 °C)   Resp 14   Wt 47 2 kg (104 lb)   LMP 10/01/2018   SpO2 98%   BMI 19 02 kg/m²        Physical Exam Physical Exam  Vitals and nursing note reviewed  Constitutional:       General: She is not in acute distress  Appearance: Normal appearance  She is normal weight  She is not ill-appearing, toxic-appearing or diaphoretic  HENT:      Head: Normocephalic and atraumatic  Eyes:      General: No scleral icterus  Right eye: No discharge  Left eye: No discharge  Extraocular Movements: Extraocular movements intact  Conjunctiva/sclera: Conjunctivae normal       Pupils: Pupils are equal, round, and reactive to light  Cardiovascular:      Rate and Rhythm: Normal rate and regular rhythm  Heart sounds: Normal heart sounds  No murmur heard  No friction rub  No gallop  Pulmonary:      Effort: Pulmonary effort is normal  No respiratory distress  Breath sounds: Normal breath sounds  No stridor  No wheezing, rhonchi or rales  Chest:      Chest wall: No tenderness  Skin:     General: Skin is warm and dry  Capillary Refill: Capillary refill takes less than 2 seconds  Neurological:      Mental Status: She is alert

## 2023-08-17 DIAGNOSIS — Z71.6 ENCOUNTER FOR SMOKING CESSATION COUNSELING: ICD-10-CM

## 2023-11-01 ENCOUNTER — TELEPHONE (OUTPATIENT)
Dept: FAMILY MEDICINE CLINIC | Facility: CLINIC | Age: 58
End: 2023-11-01

## 2023-11-01 NOTE — TELEPHONE ENCOUNTER
Patient came into the office and drop off form to excuse her from flu shot for work     Form Placed in Dr. Caitlyn Turner   Call patient when form is completed when ready for

## 2023-11-10 NOTE — TELEPHONE ENCOUNTER
Patient is calling to check that status of her exemption form. Patient would like a call at the number listed below once form is ready for .       [phone]

## 2023-11-11 DIAGNOSIS — Z71.6 ENCOUNTER FOR SMOKING CESSATION COUNSELING: ICD-10-CM

## 2023-11-21 ENCOUNTER — OFFICE VISIT (OUTPATIENT)
Dept: FAMILY MEDICINE CLINIC | Facility: CLINIC | Age: 58
End: 2023-11-21
Payer: COMMERCIAL

## 2023-11-21 VITALS
SYSTOLIC BLOOD PRESSURE: 110 MMHG | BODY MASS INDEX: 19.23 KG/M2 | WEIGHT: 104.5 LBS | OXYGEN SATURATION: 98 % | HEIGHT: 62 IN | HEART RATE: 54 BPM | DIASTOLIC BLOOD PRESSURE: 60 MMHG

## 2023-11-21 DIAGNOSIS — Z12.31 BREAST CANCER SCREENING BY MAMMOGRAM: ICD-10-CM

## 2023-11-21 DIAGNOSIS — Z12.31 ENCOUNTER FOR SCREENING MAMMOGRAM FOR BREAST CANCER: ICD-10-CM

## 2023-11-21 DIAGNOSIS — Z83.49 FAMILY HISTORY OF HEMOCHROMATOSIS: Primary | ICD-10-CM

## 2023-11-21 PROCEDURE — 99213 OFFICE O/P EST LOW 20 MIN: CPT | Performed by: FAMILY MEDICINE

## 2023-11-21 NOTE — PROGRESS NOTES
Assessment/Plan:     Diagnoses and all orders for this visit:    Family history of hemochromatosis  Patient's son was recently diagnosed with hemochromatosis. Patient does not report any known family history of hemochromatosis in her or the father of her son. Patient's hematologist has requested that  her son and his family, including the patient, be tested for the hemochromatosis mutation gene and to have an iron panel completed. -     Hemochromatosis mutation; Future  -     Iron Panel (Includes Ferritin, Iron Sat%, Iron, and TIBC); Future    Encounter for screening mammogram for breast cancer  -     Mammo screening bilateral w 3d & cad; Future    Breast cancer screening by mammogram      Subjective:      Patient ID: Whitney Mccoy is a 63 y.o. female. Patient's son is 40 and was recently diagnosed with hemochromatosis. Patient's hematologist recommended that his parents get genetic testing for the mutation as well as an iron panel. Patient reports a severe reaction to the influenza vaccine in 2018. After receiving the vaccine, she notes that she was sick for 12 days. Since then, patient has not received the influenza vaccine due to reported allergy. Patient requesting that we fill out a exemption form for her employer, complete care at [facility]. The following portions of the patient's history were reviewed and updated as appropriate: allergies, current medications, past family history, past medical history, past social history, past surgical history, and problem list.    Review of Systems   Constitutional: Negative. HENT: Negative. Eyes: Negative. Respiratory:  Negative for cough, shortness of breath and wheezing. Cardiovascular:  Negative for chest pain and leg swelling. Gastrointestinal:  Negative for abdominal pain, constipation, diarrhea, rectal pain and vomiting. Endocrine: Negative. Genitourinary: Negative. Musculoskeletal: Negative. Skin: Negative. Allergic/Immunologic: Negative. Neurological: Negative. Hematological: Negative. Psychiatric/Behavioral: Negative. Objective:      /60 (BP Location: Left arm, Patient Position: Sitting, Cuff Size: Standard)   Pulse (!) 54   Ht 5' 2" (1.575 m)   Wt 47.4 kg (104 lb 8 oz)   LMP 10/01/2018   SpO2 98%   BMI 19.11 kg/m²          Physical Exam  Vitals and nursing note reviewed. Constitutional:       Appearance: Normal appearance. HENT:      Head: Normocephalic and atraumatic. Cardiovascular:      Rate and Rhythm: Normal rate and regular rhythm. Pulses: Normal pulses. Heart sounds: Normal heart sounds. Pulmonary:      Effort: Pulmonary effort is normal.      Breath sounds: Normal breath sounds. Abdominal:      General: Abdomen is flat. Bowel sounds are normal. There is no distension. Palpations: Abdomen is soft. Tenderness: There is no abdominal tenderness. Musculoskeletal:         General: No tenderness. Normal range of motion. Cervical back: Normal range of motion. Right lower leg: No edema. Left lower leg: No edema. Skin:     General: Skin is warm and dry. Neurological:      General: No focal deficit present. Mental Status: She is alert and oriented to person, place, and time.    Psychiatric:         Mood and Affect: Mood normal.         Behavior: Behavior normal.         Narinder Varela MD  SLW FM PGY2

## 2023-12-18 ENCOUNTER — APPOINTMENT (OUTPATIENT)
Dept: LAB | Facility: HOSPITAL | Age: 58
End: 2023-12-18
Payer: COMMERCIAL

## 2023-12-18 DIAGNOSIS — Z83.49 FAMILY HISTORY OF HEMOCHROMATOSIS: ICD-10-CM

## 2023-12-18 PROCEDURE — 36415 COLL VENOUS BLD VENIPUNCTURE: CPT

## 2023-12-27 LAB — HFE GENE MUT ANL BLD/T: NORMAL

## 2023-12-29 ENCOUNTER — HOSPITAL ENCOUNTER (OUTPATIENT)
Dept: RADIOLOGY | Facility: HOSPITAL | Age: 58
Discharge: HOME | End: 2023-12-29
Payer: COMMERCIAL

## 2023-12-29 VITALS — BODY MASS INDEX: 20.24 KG/M2 | WEIGHT: 110 LBS | HEIGHT: 62 IN

## 2023-12-29 DIAGNOSIS — Z12.31 ENCOUNTER FOR SCREENING MAMMOGRAM FOR BREAST CANCER: ICD-10-CM

## 2023-12-29 PROCEDURE — 77063 BREAST TOMOSYNTHESIS BI: CPT

## 2023-12-29 PROCEDURE — 77067 SCR MAMMO BI INCL CAD: CPT

## 2023-12-31 ENCOUNTER — HOSPITAL ENCOUNTER (EMERGENCY)
Facility: HOSPITAL | Age: 58
Discharge: HOME | End: 2023-12-31
Attending: STUDENT IN AN ORGANIZED HEALTH CARE EDUCATION/TRAINING PROGRAM
Payer: COMMERCIAL

## 2023-12-31 VITALS
TEMPERATURE: 98.5 F | OXYGEN SATURATION: 99 % | SYSTOLIC BLOOD PRESSURE: 126 MMHG | HEART RATE: 65 BPM | DIASTOLIC BLOOD PRESSURE: 60 MMHG | WEIGHT: 103.8 LBS | BODY MASS INDEX: 18.99 KG/M2 | RESPIRATION RATE: 20 BRPM

## 2023-12-31 DIAGNOSIS — S61.411A LACERATION OF RIGHT HAND WITHOUT FOREIGN BODY, INITIAL ENCOUNTER: Primary | ICD-10-CM

## 2023-12-31 PROCEDURE — 90715 TDAP VACCINE 7 YRS/> IM: CPT | Performed by: STUDENT IN AN ORGANIZED HEALTH CARE EDUCATION/TRAINING PROGRAM

## 2023-12-31 PROCEDURE — 99284 EMERGENCY DEPT VISIT MOD MDM: CPT | Performed by: STUDENT IN AN ORGANIZED HEALTH CARE EDUCATION/TRAINING PROGRAM

## 2023-12-31 PROCEDURE — 99282 EMERGENCY DEPT VISIT SF MDM: CPT

## 2023-12-31 PROCEDURE — 90471 IMMUNIZATION ADMIN: CPT

## 2023-12-31 PROCEDURE — 12002 RPR S/N/AX/GEN/TRNK2.6-7.5CM: CPT | Performed by: STUDENT IN AN ORGANIZED HEALTH CARE EDUCATION/TRAINING PROGRAM

## 2023-12-31 RX ADMIN — TETANUS TOXOID, REDUCED DIPHTHERIA TOXOID AND ACELLULAR PERTUSSIS VACCINE, ADSORBED 0.5 ML: 5; 2.5; 8; 8; 2.5 SUSPENSION INTRAMUSCULAR at 18:45

## 2023-12-31 NOTE — DISCHARGE INSTRUCTIONS
You have been evaluated in the Emergency Department today for a laceration to your wrist. Your laceration was repaired in the ED with glue. Please keep the area surrounding the laceration clean and dry. If you develop redness or swelling at the site of your laceration please come back to the ER for a wound check.    It is recommended you take ibuprofen every 6 hours or tylenol every 6 hours as needed for pain.     Return to the Emergency Department if you experience discharge from your laceration, redness around your laceration, warmth around your laceration, fever, vomiting, numbness, tingling, or any other concerning symptoms.

## 2024-01-01 NOTE — ED PROVIDER NOTES
History  Chief Complaint   Patient presents with    Hand Laceration     States was washing a glass pitcher and cut right hand and wrist.      Patient is a 58-year-old female, past medical history including anxiety, and depression, who presents to the emergency department for a laceration to her right hand.  Patient was washing a glass pitcher when it broke.  She states she sustained a small laceration to her right wrist as well as the medial aspect of her right hand.  Denies any other injuries.  Unsure of her last tetanus shot.  No numbness tingling or weakness.  No other complaints or concerns.        Prior to Admission Medications   Prescriptions Last Dose Informant Patient Reported? Taking?   B Complex-C (B-complex with vitamin C) tablet   Yes No   Sig: Take 1 tablet by mouth daily   NON FORMULARY   Yes No   Sig: Hair, skin and nails   Patient not taking: Reported on 6/1/2023   Omega-3 Fatty Acids (fish oil) 1,000 mg   Yes No   Sig: Take 1,000 mg by mouth daily   Patient not taking: Reported on 11/21/2023   busPIRone (BUSPAR) 5 mg tablet  Self Yes No   Sig: Take 5 mg by mouth daily as needed   cetirizine (ZyrTEC) 10 mg tablet   No No   Sig: Take 1 tablet (10 mg total) by mouth daily   Patient not taking: Reported on 11/21/2023   citalopram (CeleXA) 20 mg tablet   Yes No   Sig: Take 40 mg by mouth every evening    Patient not taking: Reported on 11/21/2023   clonazePAM (KlonoPIN) 0.5 mg tablet  Self No No   Sig: take 1 tablet by mouth twice a day if needed   Patient not taking: Reported on 11/21/2023   ketotifen (ZADITOR) 0.025 % ophthalmic solution   No No   Sig: Administer 1 drop to both eyes 2 (two) times a day   Patient not taking: Reported on 11/21/2023   multivitamin (THERAGRAN) TABS   Yes No   Sig: Take 1 tablet by mouth daily   nicotine (NICODERM CQ) 7 mg/24hr TD 24 hr patch   No No   Sig: Place 1 patch on the skin over 24 hours every 24 hours   traZODone (DESYREL) 50 mg tablet   No No   Sig: Take 1 tablet  (50 mg total) by mouth daily at bedtime   Patient not taking: Reported on 2023      Facility-Administered Medications: None       Past Medical History:   Diagnosis Date    Anxiety     Depression     Eczema     Lyme disease        Past Surgical History:   Procedure Laterality Date    AUGMENTATION MAMMAPLASTY Bilateral        Family History   Problem Relation Age of Onset    Diabetes Mother     Hyperlipidemia Father     No Known Problems Sister     No Known Problems Sister     Breast cancer Paternal Aunt 50    Cancer Paternal Uncle     Breast cancer Paternal Grandmother 65     I have reviewed and agree with the history as documented.    E-Cigarette/Vaping    E-Cigarette Use Never User      E-Cigarette/Vaping Substances     Social History     Tobacco Use    Smoking status: Former     Current packs/day: 0.00     Average packs/day: 1 pack/day for 30.0 years (30.0 ttl pk-yrs)     Types: Cigarettes     Start date: 10/20/1989     Quit date: 10/20/2019     Years since quittin.2     Passive exposure: Past    Smokeless tobacco: Never   Vaping Use    Vaping status: Never Used   Substance Use Topics    Alcohol use: Yes     Alcohol/week: 1.0 standard drink of alcohol     Types: 1 Glasses of wine per week     Comment: socially on rare occasion     Drug use: No       Review of Systems   Skin:  Positive for wound.   Neurological:  Negative for weakness and numbness.   All other systems reviewed and are negative.      Physical Exam  Physical Exam  Vitals and nursing note reviewed.   Constitutional:       General: She is not in acute distress.     Appearance: She is well-developed. She is not ill-appearing, toxic-appearing or diaphoretic.   HENT:      Head: Normocephalic and atraumatic.      Right Ear: External ear normal.      Left Ear: External ear normal.      Nose: Nose normal.   Eyes:      General: Lids are normal. No scleral icterus.  Cardiovascular:      Rate and Rhythm: Normal rate and regular rhythm.    Pulmonary:      Effort: Pulmonary effort is normal. No respiratory distress.   Musculoskeletal:         General: No deformity. Normal range of motion.        Hands:       Cervical back: Normal range of motion and neck supple.      Comments: M/U/R/A nerve sensation intact.   Good capillary refill. 2+ radial pulses, bilaterally equal.   Finger abduction/adduction/opposition intact. .   Able to 1+2 and 1+5 finger appose.   Able to 2+3 finger cross.    Skin:     General: Skin is warm and dry.   Neurological:      General: No focal deficit present.      Mental Status: She is alert.   Psychiatric:         Mood and Affect: Mood normal.         Behavior: Behavior normal.         Vital Signs  ED Triage Vitals [12/31/23 1820]   Temperature Pulse Respirations Blood Pressure SpO2   98.5 °F (36.9 °C) 65 20 126/60 99 %      Temp Source Heart Rate Source Patient Position - Orthostatic VS BP Location FiO2 (%)   Tympanic Monitor Sitting Left arm --      Pain Score       --           Vitals:    12/31/23 1820   BP: 126/60   Pulse: 65   Patient Position - Orthostatic VS: Sitting         Visual Acuity      ED Medications  Medications   tetanus-diphtheria-acellular pertussis (BOOSTRIX) IM injection 0.5 mL (0.5 mL Intramuscular Given 12/31/23 1845)       Diagnostic Studies  Results Reviewed       None                   No orders to display              Procedures  Universal Protocol:  Consent: Verbal consent obtained.  Risks and benefits: risks, benefits and alternatives were discussed  Consent given by: patient  Timeout called at: 12/31/2023 6:45 PM.  Required items: required blood products, implants, devices, and special equipment available  Patient identity confirmed: hospital-assigned identification number  Laceration repair    Date/Time: 12/31/2023 6:45 PM    Performed by: Toby Roberto DO  Authorized by: Toby Roberto DO  Body area: upper extremity  Location details: right wrist  Laceration length: 3 cm  Tendon  "involvement: none  Nerve involvement: none  Vascular damage: no    Sedation:  Patient sedated: no      Wound Dehiscence:  Superficial Wound Dehiscence: simple closure      Procedure Details:  Preparation: Patient was prepped and draped in the usual sterile fashion.  Irrigation solution: tap water  Irrigation method: tap  Amount of cleaning: standard  Debridement: none  Degree of undermining: none  Skin closure: glue and Steri-Strips  Approximation: close  Approximation difficulty: simple  Dressing: gauze roll  Patient tolerance: patient tolerated the procedure well with no immediate complications               ED Course                               SBIRT 22yo+      Flowsheet Row Most Recent Value   Initial Alcohol Screen: US AUDIT-C     1. How often do you have a drink containing alcohol? 1 Filed at: 12/31/2023 1823   2. How many drinks containing alcohol do you have on a typical day you are drinking?  0 Filed at: 12/31/2023 1823   Audit-C Score 1 Filed at: 12/31/2023 1823   FEI: How many times in the past year have you...    Used an illegal drug or used a prescription medication for non-medical reasons? Never Filed at: 12/31/2023 1823                      Medical Decision Making  Patient is a 58 y.o. female who presents to the ED for a laceration of the right wrist.  Patient is nontoxic, well-appearing.  Vitals are stable.  On exam there is a superficial avulsion to the right wrist as well as the right medial hand.    Differential includes but is not limited to: Laceration.  Presentation not consistent with tendon injury or nerve injury.    V shaped laceration of the wrist was subsequently repaired using a combination of skin glue as well as Steri-Strips.  Tetanus updated. Will discharge.  Return precautions discussed.  Patient verbalized understanding and agreed to plan of care                 Portions of the record may have been created with voice recognition software. Occasional wrong word or \"sound a like\" " substitutions may have occurred due to the inherent limitations of voice recognition software. Read the chart carefully and recognize, using context, where substitutions have occurred.    Problems Addressed:  Laceration of right hand without foreign body, initial encounter: acute illness or injury    Risk  OTC drugs.  Prescription drug management.             Disposition  Final diagnoses:   Laceration of right hand without foreign body, initial encounter     Time reflects when diagnosis was documented in both MDM as applicable and the Disposition within this note       Time User Action Codes Description Comment    12/31/2023  6:39 PM Toby Roberto Add [S61.411A] Laceration of right hand without foreign body, initial encounter           ED Disposition       ED Disposition   Discharge    Condition   Stable    Date/Time   Sun Dec 31, 2023 3928    Comment   Theresa Rico discharge to home/self care.                   Follow-up Information       Follow up With Specialties Details Why Contact Info Additional Information    [employer]  Call in 1 day  [phone]       [facility] Emergency Department Emergency Medicine   [address] [address] [phone] [facility] Emergency Department, [address], [place], [address]            Discharge Medication List as of 12/31/2023  6:39 PM        CONTINUE these medications which have NOT CHANGED    Details   B Complex-C (B-complex with vitamin C) tablet Take 1 tablet by mouth daily, Historical Med      busPIRone (BUSPAR) 5 mg tablet Take 5 mg by mouth daily as needed, Starting Fri 10/2/2020, Historical Med      cetirizine (ZyrTEC) 10 mg tablet Take 1 tablet (10 mg total) by mouth daily, Starting Tue 5/16/2023, Normal      citalopram (CeleXA) 20 mg tablet Take 40 mg by mouth every evening , Starting Fri 10/2/2020, Historical Med      clonazePAM (KlonoPIN) 0.5 mg tablet take 1 tablet by mouth twice a day if  needed, Normal      ketotifen (ZADITOR) 0.025 % ophthalmic solution Administer 1 drop to both eyes 2 (two) times a day, Starting Thu 6/1/2023, Normal      multivitamin (THERAGRAN) TABS Take 1 tablet by mouth daily, Historical Med      nicotine (NICODERM CQ) 7 mg/24hr TD 24 hr patch Place 1 patch on the skin over 24 hours every 24 hours, Starting Wed 11/15/2023, Normal      NON FORMULARY Hair, skin and nails, Historical Med      Omega-3 Fatty Acids (fish oil) 1,000 mg Take 1,000 mg by mouth daily, Historical Med      traZODone (DESYREL) 50 mg tablet Take 1 tablet (50 mg total) by mouth daily at bedtime, Starting Thu 10/8/2020, Normal             No discharge procedures on file.    PDMP Review       None            ED Provider  Electronically Signed by             Toby Roberto DO  12/31/23 2033

## 2024-01-01 NOTE — ED PROVIDER NOTES
History  Chief Complaint   Patient presents with    Neck Pain     C/O neck pain since patient woke up yesterday  Patient states her neck pain is accompanied by severe head ache and trouble swallowing  "I can't even lay down  My boyfriend is a chiro and tried adjusting me yesterday but I feel even worse today "       History provided by:  Patient   used: No    Neck Pain   Pain location:  Generalized neck  Quality:  Aching  Pain radiates to:  Head  Pain severity:  Moderate  Pain is:  Same all the time  Onset quality:  Sudden  Duration:  2 days  Timing:  Constant  Progression:  Unchanged  Chronicity:  Recurrent  Context: not fall, not jumping from heights, not lifting a heavy object, not MCA, not MVC, not pedestrian accident and not recent injury    Relieved by:  None tried  Worsened by:  Position, swallowing, bending and twisting  Ineffective treatments:  None tried (chiropractor manipulation)  Associated symptoms: no bladder incontinence, no bowel incontinence, no chest pain, no fever, no headaches, no leg pain, no numbness, no paresis, no photophobia, no syncope, no tingling, no visual change, no weakness and no weight loss    Risk factors: no hx of head and neck radiation, no hx of osteoporosis, no hx of spinal trauma, no recent epidural, no recent head injury and no recurrent falls    Risk factors comment:  Smoker, chiropractor adjustment      Prior to Admission Medications   Prescriptions Last Dose Informant Patient Reported? Taking?   citalopram (CeleXA) 10 mg tablet   No No   Sig: Take 1 tablet (10 mg total) by mouth daily   clonazePAM (KlonoPIN) 0 5 mg tablet   Yes No   Sig: Take 0 5 mg by mouth daily   doxycycline hyclate (VIBRAMYCIN) 100 mg capsule   Yes No   fluticasone (FLONASE) 50 mcg/act nasal spray   No No   Si spray into each nostril daily      Facility-Administered Medications: None       History reviewed  No pertinent past medical history  History reviewed   No pertinent PROVIDER:[TOKEN:[6920:MIIS:6920],FOLLOWUP:[1-3 days]] surgical history  History reviewed  No pertinent family history  I have reviewed and agree with the history as documented  Social History   Substance Use Topics    Smoking status: Current Every Day Smoker     Packs/day: 0 50    Smokeless tobacco: Never Used    Alcohol use No        Review of Systems   Constitutional: Negative for appetite change, chills, diaphoresis, fatigue, fever and weight loss  HENT: Positive for sore throat and trouble swallowing  Negative for congestion, dental problem, drooling, ear pain, facial swelling, sinus pressure, sneezing, tinnitus and voice change  Eyes: Negative for photophobia  Respiratory: Negative for cough, choking, chest tightness, shortness of breath and stridor  Cardiovascular: Negative for chest pain, palpitations, leg swelling and syncope  Gastrointestinal: Negative for abdominal distention, abdominal pain, bowel incontinence, diarrhea, nausea and vomiting  Genitourinary: Negative for bladder incontinence, difficulty urinating and flank pain  Musculoskeletal: Positive for arthralgias, neck pain and neck stiffness  Negative for back pain, gait problem, joint swelling and myalgias  Skin: Negative for color change, pallor, rash and wound  Allergic/Immunologic: Negative for immunocompromised state  Neurological: Negative for dizziness, tingling, syncope, weakness, numbness and headaches  Psychiatric/Behavioral: The patient is nervous/anxious          Physical Exam  ED Triage Vitals [05/14/18 1304]   Temperature Pulse Respirations Blood Pressure SpO2   98 7 °F (37 1 °C) 76 18 140/81 100 %      Temp Source Heart Rate Source Patient Position - Orthostatic VS BP Location FiO2 (%)   Oral Monitor Sitting Right arm --      Pain Score       Worst Possible Pain           Orthostatic Vital Signs  Vitals:    05/14/18 1304 05/14/18 1308   BP: 140/81 137/61   Pulse: 76 78   Patient Position - Orthostatic VS: Sitting Sitting       Physical Exam Constitutional: She is oriented to person, place, and time  She appears well-developed and well-nourished  No distress  HENT:   Head: Normocephalic and atraumatic  Right Ear: External ear normal    Left Ear: External ear normal    Nose: Nose normal    Mouth/Throat: Oropharynx is clear and moist    Eyes: EOM are normal  Pupils are equal, round, and reactive to light  Neck: Normal range of motion  Neck supple  No JVD present  No tracheal deviation present  Mild diffuse cervical neck, > paraspinal, with  muscle spasms   Cardiovascular: Normal rate, regular rhythm and intact distal pulses  Pulmonary/Chest: Effort normal and breath sounds normal  No stridor  Abdominal: Soft  She exhibits no distension  There is no tenderness  Musculoskeletal: Normal range of motion  She exhibits tenderness  She exhibits no edema or deformity  See neck   Neurological: She is alert and oriented to person, place, and time  Skin: Skin is warm  Capillary refill takes less than 2 seconds  Psychiatric: Her behavior is normal  Thought content normal    Nursing note and vitals reviewed  ED Medications  Medications   ketorolac (TORADOL) injection 60 mg (60 mg Intramuscular Given 5/14/18 1346)       Diagnostic Studies  Results Reviewed     None                 CT spine cervical without contrast   Final Result by Carmen Daugherty MD (05/14 1426)      No cervical spine fracture or traumatic malalignment                     Workstation performed: [id number]                    Procedures  Procedures       Phone Contacts  ED Phone Contact    ED Course                               MDM  Number of Diagnoses or Management Options  Neck muscle strain: new and requires workup  Diagnosis management comments: - r/o fx/subluxation c-spine  PRN analgesia/antiinflammatories, muscle relaxant         Amount and/or Complexity of Data Reviewed  Tests in the radiology section of CPT®: ordered and reviewed  Decide to obtain previous medical records or to obtain history from someone other than the patient: yes  Review and summarize past medical records: yes    Risk of Complications, Morbidity, and/or Mortality  Presenting problems: low  Diagnostic procedures: low  Management options: low    Patient Progress  Patient progress: stable    CritCare Time    Disposition  Final diagnoses:   Neck muscle strain   Post-nasal drainage     Time reflects when diagnosis was documented in both MDM as applicable and the Disposition within this note     Time User Action Codes Description Comment    5/14/2018  2:37 PM C/ Elham Pruett 89, [address] [S16  1XXA] Neck muscle strain     5/14/2018  2:38 PM Shraddha Key [R09 [id number]] Post-nasal drainage       ED Disposition     ED Disposition Condition Comment    Discharge  Carolyn Randall discharge to home/self care      Condition at discharge: Good        Follow-up Information     Follow up With Specialties Details Why Contact Info    Doroteo Muller MD Family Medicine Schedule an appointment as soon as possible for a visit  [address]   [phone]          Discharge Medication List as of 5/14/2018  2:43 PM      START taking these medications    Details   cyclobenzaprine (FLEXERIL) 10 mg tablet Take 1 tablet (10 mg total) by mouth 2 (two) times a day as needed for muscle spasms, Starting Mon 5/14/2018, Print      naproxen (NAPROSYN) 500 mg tablet Take 1 tablet (500 mg total) by mouth 2 (two) times a day with meals, Starting Mon 5/14/2018, Print         CONTINUE these medications which have NOT CHANGED    Details   citalopram (CeleXA) 10 mg tablet Take 1 tablet (10 mg total) by mouth daily, Starting Mon 4/23/2018, Normal      clonazePAM (KlonoPIN) 0 5 mg tablet Take 0 5 mg by mouth daily, Historical Med      doxycycline hyclate (VIBRAMYCIN) 100 mg capsule Starting Fri 2/2/2018, Historical Med      fluticasone (FLONASE) 50 mcg/act nasal spray 1 spray into each nostril daily, Starting Thu 5/10/2018, Normal           No discharge procedures on file      ED Provider  Electronically Signed by           Karthikeyan Williamson MD  05/17/18 2845

## 2024-05-09 NOTE — ASSESSMENT & PLAN NOTE
· Patient advised to return for continued follow-up for anxiety and associated optimization of management  Patient currently denies any current suicidal ideation  [No Acute Distress] : no acute distress [Normal Sclera/Conjunctiva] : normal sclera/conjunctiva [Normal Outer Ear/Nose] : the ears and nose were normal in appearance [Normal Oropharynx] : the oropharynx was normal [No JVD] : no jugular venous distention [Supple] : the neck was supple [No Respiratory Distress] : no respiratory distress [Breast Exam Declined] : patient declined to have breast exam done [Normal Bowel Sounds] : normal bowel sounds [Non Tender] : non-tender [Soft] : abdomen soft [Not Distended] : not distended [Patient Refused] : rectal exam was refused by the patient [No CVA Tenderness] : no ~M costovertebral angle tenderness [No Spinal Tenderness] : no spinal tenderness [No Skin Lesions] : no skin lesions [No Motor Deficits] : the motor exam was normal [No Gross Sensory Deficits] : no gross sensory deficits [Oriented x3] : oriented to person, place, and time [Normal Affect] : the affect was normal [Normal Mood] : the mood was normal [Normal Insight/Judgement] : insight and judgment were intact [de-identified] :  engaging  conversant  and cooperative   looking younger than stated age  seems thinner  [de-identified] : wears  glasses [de-identified] : 2/6  murmur [de-identified] : antalgic  unsteady  decreased  strength and tone no obvious deformitis  [de-identified] : elongated  dystrophic brittle nails  on right foot

## 2024-08-14 ENCOUNTER — OFFICE VISIT (OUTPATIENT)
Age: 59
End: 2024-08-14

## 2024-08-14 VITALS
TEMPERATURE: 97.8 F | BODY MASS INDEX: 18.39 KG/M2 | SYSTOLIC BLOOD PRESSURE: 106 MMHG | DIASTOLIC BLOOD PRESSURE: 63 MMHG | HEIGHT: 61 IN | RESPIRATION RATE: 19 BRPM | HEART RATE: 70 BPM | OXYGEN SATURATION: 98 % | WEIGHT: 97.4 LBS

## 2024-08-14 DIAGNOSIS — A63.0 ANAL WART: Primary | ICD-10-CM

## 2024-08-14 PROCEDURE — 99203 OFFICE O/P NEW LOW 30 MIN: CPT | Performed by: FAMILY MEDICINE

## 2024-08-14 NOTE — PROGRESS NOTES
Ambulatory Visit  Name: Theresa Rico      : 1965      MRN: [id number]  Encounter Provider: Khalida Rodriguez MD  Encounter Date: 2024   Encounter department: [facility]    Assessment & Plan   1. Anal wart  Assessment & Plan:  Pt presents w/rash/bumps around the rectum area. She does not know how long it has been there but her bf noticed it 2 weeks ago while they were engaged in sexual encounter. Denies anal sex and does not use any protection a/ STD. One time previous hx of herpes and HPV+ in 2019 following by a cone procedure and D&C. Last pap smear w/co-testing was in  which was normal. Anal wart Vs skin tag  See picture in media    Gonorrhea/chlamydia sent; HIV testing ordered  Return precautions given to the patient: if the area becomes tender, pus is draining  RTO in 6 weeks for a re-check  If worsens in 6 weeks, possibly freeze it or send biopsy of one wart  Recommended patient to use protection at this time, since anal warts are contagious.   Orders:  -     Chlamydia/GC amplified DNA by PCR; Future  -     HIV 1/2 AG/AB W REFLEX [facility] only; Future  -     HIV 1/2 AG/AB W REFLEX [facility] only       History of Present Illness     HPI    Pt is a 57 yo with pmh of hld, OCD, presents today with rash around her rectum.  She does not remember when it started. Denies any anal sex.       Review of Systems   Constitutional:  Negative for chills and fever.   HENT:  Negative for ear pain and sore throat.    Eyes:  Negative for pain and visual disturbance.   Respiratory:  Negative for cough and shortness of breath.    Cardiovascular:  Negative for chest pain and palpitations.   Gastrointestinal:  Negative for abdominal pain and vomiting.   Genitourinary:  Negative for dysuria and hematuria.   Musculoskeletal:  Negative for arthralgias and back pain.   Skin:  Negative for color change and rash.   Neurological:  Negative for seizures and syncope.   All  "other systems reviewed and are negative.        Objective     /63 (BP Location: Right arm, Patient Position: Sitting, Cuff Size: Standard)   Pulse 70   Temp 97.8 °F (36.6 °C)   Resp 19   Ht 5' 1\" (1.549 m)   Wt 44.2 kg (97 lb 6.4 oz)   LMP 10/01/2018   SpO2 98%   BMI 18.40 kg/m²     Physical Exam  Constitutional:       General: She is not in acute distress.     Appearance: Normal appearance. She is normal weight. She is not ill-appearing.   HENT:      Head: Normocephalic and atraumatic.      Nose: Nose normal. No congestion or rhinorrhea.      Mouth/Throat:      Mouth: Mucous membranes are moist.      Pharynx: Oropharynx is clear. No oropharyngeal exudate or posterior oropharyngeal erythema.   Eyes:      Extraocular Movements: Extraocular movements intact.      Conjunctiva/sclera: Conjunctivae normal.      Pupils: Pupils are equal, round, and reactive to light.   Cardiovascular:      Rate and Rhythm: Normal rate and regular rhythm.      Pulses: Normal pulses.      Heart sounds: Normal heart sounds. No murmur heard.     No friction rub.   Pulmonary:      Effort: Pulmonary effort is normal. No respiratory distress.      Breath sounds: Normal breath sounds. No stridor. No wheezing or rhonchi.   Abdominal:      General: Bowel sounds are normal. There is no distension.      Palpations: Abdomen is soft. There is no mass.      Tenderness: There is no abdominal tenderness.   Genitourinary:     Rectum: Normal.      Comments: See pic of anal wart  Musculoskeletal:         General: No swelling or tenderness. Normal range of motion.      Cervical back: Normal range of motion. No rigidity.   Skin:     General: Skin is warm.      Capillary Refill: Capillary refill takes less than 2 seconds.      Findings: No bruising or erythema.   Neurological:      General: No focal deficit present.      Mental Status: She is alert and oriented to person, place, and time. Mental status is at baseline.      Coordination: " Coordination normal.   Psychiatric:         Mood and Affect: Mood normal.         Behavior: Behavior normal.         Thought Content: Thought content normal.         Judgment: Judgment normal.

## 2024-08-14 NOTE — ASSESSMENT & PLAN NOTE
Pt presents w/rash/bumps around the rectum area. She does not know how long it has been there but her bf noticed it 2 weeks ago while they were engaged in sexual encounter. Denies anal sex and does not use any protection a/ STD. One time previous hx of herpes and HPV+ in 2019 following by a cone procedure and D&C. Last pap smear w/co-testing was in 2021 which was normal. Anal wart Vs skin tag  See picture in media    Gonorrhea/chlamydia sent; HIV testing ordered  Return precautions given to the patient: if the area becomes tender, pus is draining  RTO in 6 weeks for a re-check  If worsens in 6 weeks, possibly freeze it or send biopsy of one wart  Recommended patient to use protection at this time, since anal warts are contagious.

## 2024-08-20 LAB — HIV 1+2 AB+HIV1 P24 AG SERPL QL IA: NON REACTIVE

## 2024-09-09 ENCOUNTER — RA CDI HCC (OUTPATIENT)
Dept: OTHER | Facility: HOSPITAL | Age: 59
End: 2024-09-09

## 2024-09-09 NOTE — PROGRESS NOTES
HCC coding opportunities       Chart reviewed, no opportunity found: CHART REVIEWED, NO OPPORTUNITY FOUND        Patients Insurance        Commercial Insurance: [email or web address]

## 2024-09-16 ENCOUNTER — OFFICE VISIT (OUTPATIENT)
Age: 59
End: 2024-09-16

## 2024-09-16 VITALS
OXYGEN SATURATION: 98 % | TEMPERATURE: 98 F | HEART RATE: 67 BPM | HEIGHT: 62 IN | DIASTOLIC BLOOD PRESSURE: 65 MMHG | WEIGHT: 96 LBS | BODY MASS INDEX: 17.66 KG/M2 | RESPIRATION RATE: 16 BRPM | SYSTOLIC BLOOD PRESSURE: 102 MMHG

## 2024-09-16 DIAGNOSIS — R21 SKIN RASH: ICD-10-CM

## 2024-09-16 DIAGNOSIS — Z00.00 ANNUAL PHYSICAL EXAM: Primary | ICD-10-CM

## 2024-09-16 DIAGNOSIS — Z71.6 ENCOUNTER FOR SMOKING CESSATION COUNSELING: ICD-10-CM

## 2024-09-16 DIAGNOSIS — E78.2 MIXED HYPERLIPIDEMIA: ICD-10-CM

## 2024-09-16 DIAGNOSIS — F41.9 ANXIETY AND DEPRESSION: ICD-10-CM

## 2024-09-16 DIAGNOSIS — R53.83 OTHER FATIGUE: ICD-10-CM

## 2024-09-16 DIAGNOSIS — F17.211 NICOTINE DEPENDENCE, CIGARETTES, IN REMISSION: ICD-10-CM

## 2024-09-16 DIAGNOSIS — F32.A ANXIETY AND DEPRESSION: ICD-10-CM

## 2024-09-16 PROCEDURE — 99396 PREV VISIT EST AGE 40-64: CPT | Performed by: FAMILY MEDICINE

## 2024-09-16 RX ORDER — BUSPIRONE HYDROCHLORIDE 5 MG/1
5 TABLET ORAL
Qty: 30 TABLET | Refills: 0 | Status: CANCELLED | OUTPATIENT
Start: 2024-09-16 | End: 2024-10-16

## 2024-09-16 NOTE — PROGRESS NOTES
Adult Annual Physical  Name: Theresa Rico      : 1965      MRN: [id number]  Encounter Provider: Khalida Rodriguez MD  Encounter Date: 2024   Encounter department: [facility]    Assessment & Plan  Annual physical exam    Orders:    CBC and Platelet; Future    CBC and Platelet    TSH, 3rd generation with Free T4 reflex; Future    TSH, 3rd generation with Free T4 reflex    Lipid panel; Future    Lipid panel    Nicotine dependence, cigarettes, in remission    Orders:    CT lung screening program; Future    Encounter for smoking cessation counseling    Orders:    nicotine (NICODERM CQ) 7 mg/24hr TD 24 hr patch; Place 1 patch on the skin over 16 hours every 24 hours    Skin rash    Orders:    Ambulatory Referral to Dermatology; Future    Other fatigue    Orders:    Vitamin D 25 hydroxy; Future    Vitamin D 25 hydroxy    TSH, 3rd generation with Free T4 reflex; Future    TSH, 3rd generation with Free T4 reflex    Mixed hyperlipidemia         Anxiety and depression  Depression Screening Follow-up Plan: Patient's depression screening was positive with a PHQ-9 score of 6. Patient assessed for underlying major depression. They have no active suicidal ideations. Brief counseling provided and recommend additional follow-up/re-evaluation next office visit.         Immunizations and preventive care screenings were discussed with patient today. Appropriate education was printed on patient's after visit summary.    Counseling:  Alcohol/drug use: discussed moderation in alcohol intake, the recommendations for healthy alcohol use, and avoidance of illicit drug use.    BMI Counseling: Body mass index is 17.56 kg/m². The BMI is below normal. Dietary education for weight gain was provided. Rationale for BMI follow-up plan is due to patient being underweight.     Depression Screening and Follow-up Plan: Patient's depression screening was positive with a PHQ-9 score of 6. Patient with underlying  depression and was advised to continue current medications as prescribed.         History of Present Illness     Adult Annual Physical:  Patient presents for annual physical.     Diet and Physical Activity:  - Diet/Nutrition: well balanced diet.  - Exercise: walking.    Depression Screening:    - PHQ-9 Score: 6    General Health:  - Sleep: 7-8 hours of sleep on average.  - Hearing: normal hearing right ear.  - Vision: no vision problems.  - Dental: regular dental visits.    /GYN Health:  - Follows with GYN: yes.   - Menopause: postmenopausal.     Review of Systems   Constitutional:  Positive for appetite change and fatigue. Negative for fever.   HENT:  Negative for congestion.    Respiratory:  Negative for cough, chest tightness and shortness of breath.    Gastrointestinal:  Negative for constipation, diarrhea and vomiting.   Genitourinary:  Negative for difficulty urinating.   Musculoskeletal:  Negative for back pain.   Skin:  Positive for rash.   Neurological:  Negative for dizziness, light-headedness and headaches.   Psychiatric/Behavioral:  Negative for agitation.      Pertinent Medical History         Medical History Reviewed by provider this encounter:  Meds       Past Medical History   Past Medical History:   Diagnosis Date    Anxiety     Depression     Eczema     Lyme disease      Past Surgical History:   Procedure Laterality Date    AUGMENTATION MAMMAPLASTY Bilateral 1990     Family History   Problem Relation Age of Onset    Diabetes Mother     Hyperlipidemia Father     No Known Problems Sister     No Known Problems Sister     Breast cancer Paternal Aunt 50    Cancer Paternal Uncle     Breast cancer Paternal Grandmother 65     Current Outpatient Medications on File Prior to Visit   Medication Sig Dispense Refill    busPIRone (BUSPAR) 5 mg tablet Take 5 mg by mouth daily as needed      citalopram (CeleXA) 20 mg tablet Take 40 mg by mouth every evening      clonazePAM (KlonoPIN) 0.5 mg tablet take 1 tablet  "by mouth twice a day if needed 60 tablet 5    multivitamin (THERAGRAN) TABS Take 1 tablet by mouth daily      NON FORMULARY Hair, skin and nails      [DISCONTINUED] nicotine (NICODERM CQ) 7 mg/24hr TD 24 hr patch Place 1 patch on the skin over 24 hours every 24 hours 28 patch 2    B Complex-C (B-complex with vitamin C) tablet Take 1 tablet by mouth daily      ketotifen (ZADITOR) 0.025 % ophthalmic solution Administer 1 drop to both eyes 2 (two) times a day (Patient not taking: Reported on 11/21/2023) 5 mL 0    Omega-3 Fatty Acids (fish oil) 1,000 mg Take 1,000 mg by mouth daily (Patient not taking: Reported on 11/21/2023)      [DISCONTINUED] cetirizine (ZyrTEC) 10 mg tablet Take 1 tablet (10 mg total) by mouth daily (Patient not taking: Reported on 11/21/2023) 30 tablet 2    [DISCONTINUED] traZODone (DESYREL) 50 mg tablet Take 1 tablet (50 mg total) by mouth daily at bedtime 14 tablet 0     No current facility-administered medications on file prior to visit.     Allergies   Allergen Reactions    Fluarix [Influenza Virus Vaccine] Other (See Comments)     States she is allergic to Flu Vaccine, was sick after receiving         Objective     /65 (BP Location: Left arm, Patient Position: Sitting, Cuff Size: Adult)   Pulse 67   Temp 98 °F (36.7 °C) (Tympanic)   Resp 16   Ht 5' 2\" (1.575 m)   Wt 43.5 kg (96 lb)   LMP 10/01/2018   SpO2 98%   BMI 17.56 kg/m²     Physical Exam  Constitutional:       General: She is not in acute distress.     Appearance: Normal appearance. She is normal weight. She is not ill-appearing.   HENT:      Head: Normocephalic and atraumatic.      Right Ear: Tympanic membrane, ear canal and external ear normal. There is no impacted cerumen.      Left Ear: Tympanic membrane, ear canal and external ear normal. There is no impacted cerumen.      Nose: Nose normal. No congestion or rhinorrhea.      Mouth/Throat:      Mouth: Mucous membranes are moist.      Pharynx: Oropharynx is clear. No " oropharyngeal exudate or posterior oropharyngeal erythema.   Eyes:      Extraocular Movements: Extraocular movements intact.      Conjunctiva/sclera: Conjunctivae normal.      Pupils: Pupils are equal, round, and reactive to light.   Cardiovascular:      Rate and Rhythm: Normal rate and regular rhythm.      Pulses: Normal pulses.      Heart sounds: Normal heart sounds. No murmur heard.     No friction rub.   Pulmonary:      Effort: Pulmonary effort is normal. No respiratory distress.      Breath sounds: Normal breath sounds. No stridor. No wheezing or rhonchi.   Abdominal:      General: Bowel sounds are normal. There is no distension.      Palpations: Abdomen is soft. There is no mass.      Tenderness: There is no abdominal tenderness.   Musculoskeletal:         General: No swelling or tenderness. Normal range of motion.      Cervical back: Normal range of motion. No rigidity.   Skin:     General: Skin is warm.      Capillary Refill: Capillary refill takes less than 2 seconds.      Findings: No bruising or erythema.   Neurological:      General: No focal deficit present.      Mental Status: She is alert and oriented to person, place, and time. Mental status is at baseline.   Psychiatric:         Mood and Affect: Mood normal.         Behavior: Behavior normal.         Thought Content: Thought content normal.         Judgment: Judgment normal.       Administrative Statements

## 2024-09-16 NOTE — PATIENT INSTRUCTIONS
"Patient Education     Routine physical for adults   The Basics   Written by the doctors and editors at [facility]   What is a physical? -- A physical is a routine visit, or \"check-up,\" with your doctor. You might also hear it called a \"wellness visit\" or \"preventive visit.\"  During each visit, the doctor will:   Ask about your physical and mental health   Ask about your habits, behaviors, and lifestyle   Do an exam   Give you vaccines if needed   Talk to you about any medicines you take   Give advice about your health   Answer your questions  Getting regular check-ups is an important part of taking care of your health. It can help your doctor find and treat any problems you have. But it's also important for preventing health problems.  A routine physical is different from a \"sick visit.\" A sick visit is when you see a doctor because of a health concern or problem. Since physicals are scheduled ahead of time, you can think about what you want to ask the doctor.  How often should I get a physical? -- It depends on your age and health. In general, for people age 21 years and older:   If you are younger than 50 years, you might be able to get a physical every 3 years.   If you are 50 years or older, your doctor might recommend a physical every year.  If you have an ongoing health condition, like diabetes or high blood pressure, your doctor will probably want to see you more often.  What happens during a physical? -- In general, each visit will include:   Physical exam - The doctor or nurse will check your height, weight, heart rate, and blood pressure. They will also look at your eyes and ears. They will ask about how you are feeling and whether you have any symptoms that bother you.   Medicines - It's a good idea to bring a list of all the medicines you take to each doctor visit. Your doctor will talk to you about your medicines and answer any questions. Tell them if you are having any side effects that bother you. You " "should also tell them if you are having trouble paying for any of your medicines.   Habits and behaviors - This includes:   Your diet   Your exercise habits   Whether you smoke, drink alcohol, or use drugs   Whether you are sexually active   Whether you feel safe at home  Your doctor will talk to you about things you can do to improve your health and lower your risk of health problems. They will also offer help and support. For example, if you want to quit smoking, they can give you advice and might prescribe medicines. If you want to improve your diet or get more physical activity, they can help you with this, too.   Lab tests, if needed - The tests you get will depend on your age and situation. For example, your doctor might want to check your:   Cholesterol   Blood sugar   Iron level   Vaccines - The recommended vaccines will depend on your age, health, and what vaccines you already had. Vaccines are very important because they can prevent certain serious or deadly infections.   Discussion of screening - \"Screening\" means checking for diseases or other health problems before they cause symptoms. Your doctor can recommend screening based on your age, risk, and preferences. This might include tests to check for:   Cancer, such as breast, prostate, cervical, ovarian, colorectal, prostate, lung, or skin cancer   Sexually transmitted infections, such as chlamydia and gonorrhea   Mental health conditions like depression and anxiety  Your doctor will talk to you about the different types of screening tests. They can help you decide which screenings to have. They can also explain what the results might mean.   Answering questions - The physical is a good time to ask the doctor or nurse questions about your health. If needed, they can refer you to other doctors or specialists, too.  Adults older than 65 years often need other care, too. As you get older, your doctor will talk to you about:   How to prevent falling at " home   Hearing or vision tests   Memory testing   How to take your medicines safely   Making sure that you have the help and support you need at home  All topics are updated as new evidence becomes available and our peer review process is complete.  This topic retrieved from [employer] ([place]) on: May 02, 2024.  Topic 799337 Version 1.0  Release: 32.4.3 - C32.122  © 2024 UpToDate, Inc. and/or its affiliates. All rights reserved.  Consumer Information Use and Disclaimer   Disclaimer: This generalized information is a limited summary of diagnosis, treatment, and/or medication information. It is not meant to be comprehensive and should be used as a tool to help the user understand and/or assess potential diagnostic and treatment options. It does NOT include all information about conditions, treatments, medications, side effects, or risks that may apply to a specific patient. It is not intended to be medical advice or a substitute for the medical advice, diagnosis, or treatment of a health care provider based on the health care provider's examination and assessment of a patient's specific and unique circumstances. Patients must speak with a health care provider for complete information about their health, medical questions, and treatment options, including any risks or benefits regarding use of medications. This information does not endorse any treatments or medications as safe, effective, or approved for treating a specific patient. UpToDate, Inc. and its affiliates disclaim any warranty or liability relating to this information or the use thereof.The use of this information is governed by the Terms of Use, available at https://www.[employer] Servicesuwer.com/en/know/clinical-effectiveness-terms. 2024© UpToDate, Inc. and its affiliates and/or licensors. All rights reserved.  Copyright   © 2024 UpToDate, Inc. and/or its affiliates. All rights reserved.    Patient Education     Routine physical for adults   The Basics   Written by the  [facility]   What is a physical? -- A physical is a routine visit, or \"check-up,\" with your doctor. You might also hear it called a \"wellness visit\" or \"preventive visit.\"  During each visit, the doctor will:   Ask about your physical and mental health   Ask about your habits, behaviors, and lifestyle   Do an exam   Give you vaccines if needed   Talk to you about any medicines you take   Give advice about your health   Answer your questions  Getting regular check-ups is an important part of taking care of your health. It can help your doctor find and treat any problems you have. But it's also important for preventing health problems.  A routine physical is different from a \"sick visit.\" A sick visit is when you see a doctor because of a health concern or problem. Since physicals are scheduled ahead of time, you can think about what you want to ask the doctor.  How often should I get a physical? -- It depends on your age and health. In general, for people age 21 years and older:   If you are younger than 50 years, you might be able to get a physical every 3 years.   If you are 50 years or older, your doctor might recommend a physical every year.  If you have an ongoing health condition, like diabetes or high blood pressure, your doctor will probably want to see you more often.  What happens during a physical? -- In general, each visit will include:   Physical exam - The doctor or nurse will check your height, weight, heart rate, and blood pressure. They will also look at your eyes and ears. They will ask about how you are feeling and whether you have any symptoms that bother you.   Medicines - It's a good idea to bring a list of all the medicines you take to each doctor visit. Your doctor will talk to you about your medicines and answer any questions. Tell them if you are having any side effects that bother you. You should also tell them if you are having trouble paying for any of your " "medicines.   Habits and behaviors - This includes:   Your diet   Your exercise habits   Whether you smoke, drink alcohol, or use drugs   Whether you are sexually active   Whether you feel safe at home  Your doctor will talk to you about things you can do to improve your health and lower your risk of health problems. They will also offer help and support. For example, if you want to quit smoking, they can give you advice and might prescribe medicines. If you want to improve your diet or get more physical activity, they can help you with this, too.   Lab tests, if needed - The tests you get will depend on your age and situation. For example, your doctor might want to check your:   Cholesterol   Blood sugar   Iron level   Vaccines - The recommended vaccines will depend on your age, health, and what vaccines you already had. Vaccines are very important because they can prevent certain serious or deadly infections.   Discussion of screening - \"Screening\" means checking for diseases or other health problems before they cause symptoms. Your doctor can recommend screening based on your age, risk, and preferences. This might include tests to check for:   Cancer, such as breast, prostate, cervical, ovarian, colorectal, prostate, lung, or skin cancer   Sexually transmitted infections, such as chlamydia and gonorrhea   Mental health conditions like depression and anxiety  Your doctor will talk to you about the different types of screening tests. They can help you decide which screenings to have. They can also explain what the results might mean.   Answering questions - The physical is a good time to ask the doctor or nurse questions about your health. If needed, they can refer you to other doctors or specialists, too.  Adults older than 65 years often need other care, too. As you get older, your doctor will talk to you about:   How to prevent falling at home   Hearing or vision tests   Memory testing   How to take your " medicines safely   Making sure that you have the help and support you need at home  All topics are updated as new evidence becomes available and our peer review process is complete.  This topic retrieved from [employer] on: May 02, 2024.  Topic 942600 Version 1.0  Release: 32.4.3 - C32.122  © 2024 UpToDate, Inc. and/or its affiliates. All rights reserved.  Consumer Information Use and Disclaimer   Disclaimer: This generalized information is a limited summary of diagnosis, treatment, and/or medication information. It is not meant to be comprehensive and should be used as a tool to help the user understand and/or assess potential diagnostic and treatment options. It does NOT include all information about conditions, treatments, medications, side effects, or risks that may apply to a specific patient. It is not intended to be medical advice or a substitute for the medical advice, diagnosis, or treatment of a health care provider based on the health care provider's examination and assessment of a patient's specific and unique circumstances. Patients must speak with a health care provider for complete information about their health, medical questions, and treatment options, including any risks or benefits regarding use of medications. This information does not endorse any treatments or medications as safe, effective, or approved for treating a specific patient. UpToDate, Inc. and its affiliates disclaim any warranty or liability relating to this information or the use thereof.The use of this information is governed by the Terms of Use, available at https://www.wolters[employer]uwer.com/en/know/clinical-effectiveness-terms. 2024© UpToDate, Inc. and its affiliates and/or licensors. All rights reserved.  Copyright   © 2024 UpToDate, Inc. and/or its affiliates. All rights reserved.

## 2024-09-16 NOTE — ASSESSMENT & PLAN NOTE
Depression Screening Follow-up Plan: Patient's depression screening was positive with a PHQ-9 score of 6. Patient assessed for underlying major depression. They have no active suicidal ideations. Brief counseling provided and recommend additional follow-up/re-evaluation next office visit.

## 2024-09-16 NOTE — ASSESSMENT & PLAN NOTE
Pt has a 0.5 cm irregular border skin rash, brown in color and flat. Patient is not sure when she first noticed it with it has been there since a while.  Denies any family history of skin cancer, looks more like a sun burn.  See media for picture  Recommended patient to keep an eye on the size, color.  RTO in 6 weeks for a follow-up  Orders:    Ambulatory Referral to Dermatology; Future

## 2024-09-16 NOTE — ASSESSMENT & PLAN NOTE
Patient's last lipid panel in 2023 showed LDL of 159 and total cholesterol of 251.  Patient states that she does not want to take any statin medication because she had an allergy.  Patient states that she might be open to starting statins if necessary.   lipid panel ordered

## 2024-09-16 NOTE — ASSESSMENT & PLAN NOTE
Orders:    Vitamin D 25 hydroxy; Future    Vitamin D 25 hydroxy    TSH, 3rd generation with Free T4 reflex; Future    TSH, 3rd generation with Free T4 reflex

## 2024-09-16 NOTE — ASSESSMENT & PLAN NOTE
Patient states that since the last couple weeks she has been more fatigued, with decreased appetite.  Also states that she is lazy to make herself a meal and recently her mom was diagnosed with nasal cancer and she is having a difficult time eating which is taking a toll on the patient.  She follows up regularly with her psychiatrist last visit was 3 weeks ago.  Recommended patient to follow-up with her psychiatrist, consider starting Remeron for appetite stimulation  Vitamin D and TSH ordered  Continue with behavioral therapies and yoga therapies that patient is already doing  Orders:    Vitamin D 25 hydroxy; Future    Vitamin D 25 hydroxy    TSH, 3rd generation with Free T4 reflex; Future    TSH, 3rd generation with Free T4 reflex

## 2024-09-16 NOTE — ASSESSMENT & PLAN NOTE
Patient's PHQ-9 shows mild depression.  Patient's mom was recently diagnosed with cancer and patient has a hard time coping with that.  Patient states that this is more situational and she has been feeling more fatigued than usual, tired, with a decreased appetite.  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  Continue BuSpar 5 mg daily, Celexa 40 mg daily, Klonopin 0.5 twice daily as needed  Follow-up with her psychiatrist in the next week  RTO in 6 weeks    Depression Screening Follow-up Plan: Patient's depression screening was positive with a PHQ-9 score of 6. Patient assessed for underlying major depression. They have no active suicidal ideations. Brief counseling provided and recommend additional follow-up/re-evaluation next office visit.

## 2024-10-03 ENCOUNTER — TELEPHONE (OUTPATIENT)
Age: 59
End: 2024-10-03

## 2024-10-03 NOTE — TELEPHONE ENCOUNTER
Patient is requesting a letter that states she is allergic to Flu Vaccines and needs a letter form PCP stating she may be exempt . Letter has been generated in the past. Patient would like a call back once letter is ready for .    [phone]

## 2024-10-12 LAB
25(OH)D3+25(OH)D2 SERPL-MCNC: 51.7 NG/ML (ref 30–100)
CHOLEST SERPL-MCNC: 218 MG/DL (ref 100–199)
CHOLEST/HDLC SERPL: 2.5 RATIO (ref 0–4.4)
ERYTHROCYTE [DISTWIDTH] IN BLOOD BY AUTOMATED COUNT: 11.8 % (ref 11.7–15.4)
HCT VFR BLD AUTO: 36.6 % (ref 34–46.6)
HDLC SERPL-MCNC: 87 MG/DL
HGB BLD-MCNC: 12.2 G/DL (ref 11.1–15.9)
LDLC SERPL CALC-MCNC: 124 MG/DL (ref 0–99)
MCH RBC QN AUTO: 31.3 PG (ref 26.6–33)
MCHC RBC AUTO-ENTMCNC: 33.3 G/DL (ref 31.5–35.7)
MCV RBC AUTO: 94 FL (ref 79–97)
PLATELET # BLD AUTO: 201 X10E3/UL (ref 150–450)
RBC # BLD AUTO: 3.9 X10E6/UL (ref 3.77–5.28)
SL AMB VLDL CHOLESTEROL CALC: 7 MG/DL (ref 5–40)
TRIGL SERPL-MCNC: 42 MG/DL (ref 0–149)
TSH SERPL DL<=0.005 MIU/L-ACNC: 0.42 UIU/ML (ref 0.45–4.5)
WBC # BLD AUTO: 4.6 X10E3/UL (ref 3.4–10.8)

## 2024-10-18 ENCOUNTER — RA CDI HCC (OUTPATIENT)
Dept: OTHER | Facility: HOSPITAL | Age: 59
End: 2024-10-18

## 2024-10-18 NOTE — PROGRESS NOTES
HCC coding opportunities       Chart reviewed, no opportunity found: CHART REVIEWED, NO OPPORTUNITY FOUND        Patients Insurance        Commercial Insurance: [employer]

## 2024-10-25 ENCOUNTER — OFFICE VISIT (OUTPATIENT)
Age: 59
End: 2024-10-25

## 2024-10-25 ENCOUNTER — HOSPITAL ENCOUNTER (OUTPATIENT)
Dept: RADIOLOGY | Facility: HOSPITAL | Age: 59
Discharge: HOME | End: 2024-10-25

## 2024-10-25 VITALS
RESPIRATION RATE: 17 BRPM | WEIGHT: 98 LBS | HEART RATE: 57 BPM | DIASTOLIC BLOOD PRESSURE: 61 MMHG | HEIGHT: 62 IN | OXYGEN SATURATION: 98 % | SYSTOLIC BLOOD PRESSURE: 95 MMHG | BODY MASS INDEX: 18.03 KG/M2

## 2024-10-25 DIAGNOSIS — Z83.3 FAMILY HISTORY OF BORDERLINE DIABETES MELLITUS: ICD-10-CM

## 2024-10-25 DIAGNOSIS — E78.2 MIXED HYPERLIPIDEMIA: ICD-10-CM

## 2024-10-25 DIAGNOSIS — F17.211 NICOTINE DEPENDENCE, CIGARETTES, IN REMISSION: ICD-10-CM

## 2024-10-25 DIAGNOSIS — A63.0 ANAL WART: Primary | ICD-10-CM

## 2024-10-25 PROCEDURE — 99213 OFFICE O/P EST LOW 20 MIN: CPT | Performed by: FAMILY MEDICINE

## 2024-10-25 NOTE — PROGRESS NOTES
"Ambulatory Visit  Name: Theresa Rico      : 1965      MRN: [id number]  Encounter Provider: Khalida Rodriguez MD  Encounter Date: 10/25/2024   Encounter department: [facility]    Assessment & Plan  Anal wart  Pt has anal warts since last couple months, not sure when she noticed them.  Has a hx of HPV + since 2019 and herpes.  Pap smear  in 2019 was negative for HPV 16 and positive for HPV 18.  Last Pap in  was negative for intraepithelial lesion or malignancy and was negative for all HPV types.  Patient is with the same partner that she has been since the last many years.  See picture in media    Recommended pt to get another Pap smear with HPV co testing  Consider sending the wart tissue for HPV testing, if it is bothering the patient.         Mixed hyperlipidemia    Orders:    Lipid panel; Future    Lipid panel    Family history of borderline diabetes mellitus    Orders:    Hemoglobin A1C; Future    Hemoglobin A1C       History of Present Illness     HPI    Patient is a 59-year-old female presents today for anal wart follow-up.      Review of Systems   Constitutional:  Negative for chills and fever.   HENT:  Negative for ear pain and sore throat.    Eyes:  Negative for pain and visual disturbance.   Respiratory:  Negative for cough and shortness of breath.    Cardiovascular:  Negative for chest pain and palpitations.   Gastrointestinal:  Negative for abdominal pain and vomiting.   Genitourinary:  Negative for dysuria and hematuria.   Musculoskeletal:  Negative for arthralgias and back pain.   Skin:  Negative for color change and rash.   Neurological:  Negative for seizures and syncope.   All other systems reviewed and are negative.    Medical History Reviewed by provider this encounter:  Meds           Objective     BP 95/61 (BP Location: Left arm, Patient Position: Sitting)   Pulse 57   Resp 17   Ht 5' 2\" (1.575 m)   Wt 44.5 kg (98 lb)   LMP 10/01/2018   SpO2 98%  "  BMI 17.92 kg/m²     Physical Exam  Constitutional:       Appearance: Normal appearance.   Cardiovascular:      Rate and Rhythm: Normal rate and regular rhythm.   Genitourinary:     General: Normal vulva.      Rectum: Guaiac result negative. No tenderness or external hemorrhoid.      Comments: Anal warts, see media for picture. 8/14/24  Neurological:      Mental Status: She is alert.

## 2024-10-25 NOTE — ASSESSMENT & PLAN NOTE
Pt has anal warts since last couple months, not sure when she noticed them.  Has a hx of HPV + since 2019 and herpes.  Pap smear  in 2019 was negative for HPV 16 and positive for HPV 18.  Last Pap in 2021 was negative for intraepithelial lesion or malignancy and was negative for all HPV types.  Patient is with the same partner that she has been since the last many years.  See picture in media    Recommended pt to get another Pap smear with HPV co testing  Consider sending the wart tissue for HPV testing, if it is bothering the patient.

## 2024-10-31 ENCOUNTER — TELEPHONE (OUTPATIENT)
Age: 59
End: 2024-10-31

## 2024-10-31 NOTE — TELEPHONE ENCOUNTER
[facility] called to let pcp know that the CT Lung screening done on 10/25 has significant findings. Please Review, Thank you

## 2024-11-01 DIAGNOSIS — R91.8 ABNORMAL CT LUNG SCREENING: Primary | ICD-10-CM

## 2024-11-19 ENCOUNTER — ANNUAL EXAM (OUTPATIENT)
Age: 59
End: 2024-11-19

## 2024-11-19 VITALS
TEMPERATURE: 98 F | SYSTOLIC BLOOD PRESSURE: 111 MMHG | DIASTOLIC BLOOD PRESSURE: 64 MMHG | OXYGEN SATURATION: 99 % | BODY MASS INDEX: 18.14 KG/M2 | RESPIRATION RATE: 18 BRPM | WEIGHT: 98.6 LBS | HEIGHT: 62 IN | HEART RATE: 77 BPM

## 2024-11-19 DIAGNOSIS — Z98.890 HX OF CONE BIOPSY OF CERVIX: ICD-10-CM

## 2024-11-19 DIAGNOSIS — Z01.419 ENCOUNTER FOR ANNUAL ROUTINE GYNECOLOGICAL EXAMINATION: Primary | ICD-10-CM

## 2024-11-19 DIAGNOSIS — Z87.42 HX OF ABNORMAL CERVICAL PAP SMEAR: ICD-10-CM

## 2024-11-19 PROCEDURE — 87624 HPV HI-RISK TYP POOLED RSLT: CPT

## 2024-11-19 PROCEDURE — 88175 CYTOPATH C/V AUTO FLUID REDO: CPT

## 2024-11-19 PROCEDURE — 99396 PREV VISIT EST AGE 40-64: CPT | Performed by: FAMILY MEDICINE

## 2024-11-19 NOTE — PROGRESS NOTES
"Denys Rico is a 59 y.o. female who presents for annual well woman exam.     GYN:  No vaginal discharge, labial erythema or lesions, dyspareunia, intermenstrual bleeding, spotting, or discharge.  Menarche: 8th grade. Menopause:   Menses are regular, q 30 days  Postmenopausal bleeding- No   Patient is  sexually active with 1 partner.  Prior gynecologic surgeries: DnC in , precancerous cell on cervix. Cone biopsy when she was 31.     OB:   female  Pregnancies were normal deliveries.    :  No dysuria, urinary frequency or urgency, hematuria, flank pain, incontinence.    Breast:  No breast mass, skin changes, dimpling, reddening, nipple retraction, breast discharge.  Patient does not have a family history of breast, endometrial, or ovarian ca.     Lifestyle:  Diet/Nutrition: well balanced diet.   Exercise: walking.   Vitamin D and calcium supplements   Calcium 600mg BID because only absorb 600mg at a time    Screening/Preventative:  Cervical cancer: last pap smear in . Results were normal.  Breast cancer: last mammogram in 2023. Results were normal.  Colon cancer: [place] [facility] 2023 Results were Negative.  STD screening: yes.      Review of Systems  Pertinent items are noted in HPI.      Objective      /64 (BP Location: Left arm, Patient Position: Sitting, Cuff Size: Child)   Pulse 77   Temp 98 °F (36.7 °C) (Axillary)   Resp 18   Ht 5' 2\" (1.575 m)   Wt 44.7 kg (98 lb 9.6 oz)   LMP 10/01/2018   SpO2 99%   BMI 18.03 kg/m²     Physical Exam  Cardiovascular:      Rate and Rhythm: Normal rate and regular rhythm.      Pulses: Normal pulses.      Heart sounds: Normal heart sounds.   Pulmonary:      Effort: Pulmonary effort is normal.      Breath sounds: Normal breath sounds.   Abdominal:      General: Abdomen is flat.   Genitourinary:     General: Normal vulva.      Vagina: No vaginal discharge.      Rectum: Normal. Guaiac result negative.   Skin:     Capillary Refill: " Capillary refill takes less than 2 seconds.                 Assessment & Plan:    1. Encounter for annual routine gynecological examination  Pt is doing well has a hx of abnormal pap in 2020 which was followed by a DnC and a cone biopsy when she was 31. Pt has a new partner since the last year.   Repeat pap with HPV testing done today  Cologuard 12/2023 was negative, repeat in 2 years  Mammogram ordered, last one in 12/23 was negative   Discussed self breast awareness.   Discussed preventive care such as calcium and vitamin D supplementation, importance of weight bearing at least 3x a week to improve muscle mass.

## 2024-11-20 LAB
HPV HR 12 DNA CVX QL NAA+PROBE: NEGATIVE
HPV16 DNA CVX QL NAA+PROBE: NEGATIVE
HPV18 DNA CVX QL NAA+PROBE: POSITIVE

## 2024-11-24 LAB
C TRACH RRNA SPEC QL NAA+PROBE: NEGATIVE
N GONORRHOEA RRNA SPEC QL NAA+PROBE: NEGATIVE

## 2024-11-25 LAB
LAB AP GYN PRIMARY INTERPRETATION: NORMAL
Lab: NORMAL

## 2024-11-26 ENCOUNTER — OFFICE VISIT (OUTPATIENT)
Age: 59
End: 2024-11-26

## 2024-11-26 VITALS
WEIGHT: 100.4 LBS | TEMPERATURE: 97.9 F | OXYGEN SATURATION: 98 % | SYSTOLIC BLOOD PRESSURE: 114 MMHG | HEART RATE: 62 BPM | DIASTOLIC BLOOD PRESSURE: 70 MMHG | BODY MASS INDEX: 18.36 KG/M2

## 2024-11-26 DIAGNOSIS — Z12.4 SCREENING FOR CERVICAL CANCER: Primary | ICD-10-CM

## 2024-11-26 PROCEDURE — G0476 HPV COMBO ASSAY CA SCREEN: HCPCS

## 2024-11-26 PROCEDURE — G0145 SCR C/V CYTO,THINLAYER,RESCR: HCPCS

## 2024-11-26 PROCEDURE — 99213 OFFICE O/P EST LOW 20 MIN: CPT | Performed by: FAMILY MEDICINE

## 2024-11-26 NOTE — PROGRESS NOTES
Name: Theresa Rico      : 1965      MRN: [id number]  Encounter Provider: Khalida Rodriguez MD  Encounter Date: 2024   Encounter department: [facility]  :  Assessment & Plan  Screening for cervical cancer  Last pap smear was unsuccessful but patient was posotive for HPV 18.   Repeat pap smear completed today  If pap smear is normal, repeat in 6 months  If pap smear is abnormal do a colposcopy or Leep pending results   Orders:    Liquid-based pap, screening           History of Present Illness     HPI  Pt is a 58 yo female who presents today to repeat pap smear since last attempt was unsuccessful.  DnC in , precancerous cell on cervix. Cone biopsy when she was 31.     Review of Systems   Constitutional:  Negative for chills and fever.   Respiratory:  Negative for cough and shortness of breath.    Cardiovascular:  Negative for chest pain and palpitations.   Gastrointestinal:  Negative for abdominal pain and vomiting.   Genitourinary:  Negative for dysuria, hematuria and vaginal bleeding.   Musculoskeletal:  Negative for arthralgias and back pain.   Skin:  Negative for color change and rash.   Neurological:  Negative for seizures and syncope.   All other systems reviewed and are negative.         Objective   /70 (BP Location: Left arm, Patient Position: Sitting, Cuff Size: Standard)   Pulse 62   Temp 97.9 °F (36.6 °C) (Tympanic)   Wt 45.5 kg (100 lb 6.4 oz)   LMP 10/01/2018   SpO2 98%   BMI 18.36 kg/m²      Physical Exam  Constitutional:       Appearance: Normal appearance.   Pulmonary:      Effort: Pulmonary effort is normal.      Breath sounds: Normal breath sounds.   Abdominal:      General: Abdomen is flat.      Palpations: Abdomen is soft.   Genitourinary:     General: Normal vulva.      Rectum: Normal.   Neurological:      General: No focal deficit present.      Mental Status: She is alert.   Psychiatric:         Mood and Affect: Mood normal.          Behavior: Behavior normal.         Thought Content: Thought content normal.         Judgment: Judgment normal.

## 2024-11-27 LAB
HPV HR 12 DNA CVX QL NAA+PROBE: NEGATIVE
HPV16 DNA CVX QL NAA+PROBE: NEGATIVE
HPV18 DNA CVX QL NAA+PROBE: NEGATIVE

## 2024-11-29 ENCOUNTER — RESULTS FOLLOW-UP (OUTPATIENT)
Age: 59
End: 2024-11-29

## 2024-12-04 LAB
LAB AP GYN PRIMARY INTERPRETATION: NORMAL
Lab: NORMAL

## 2025-01-02 ENCOUNTER — HOSPITAL ENCOUNTER (OUTPATIENT)
Dept: RADIOLOGY | Facility: HOSPITAL | Age: 60
Discharge: HOME | End: 2025-01-02
Payer: COMMERCIAL

## 2025-01-02 DIAGNOSIS — R91.8 ABNORMAL CT LUNG SCREENING: ICD-10-CM

## 2025-01-02 PROCEDURE — 71250 CT THORAX DX C-: CPT

## 2025-01-08 ENCOUNTER — RESULTS FOLLOW-UP (OUTPATIENT)
Age: 60
End: 2025-01-08

## 2025-01-08 DIAGNOSIS — R91.1 LUNG NODULE: Primary | ICD-10-CM

## 2025-02-17 ENCOUNTER — HOSPITAL ENCOUNTER (OUTPATIENT)
Dept: RADIOLOGY | Facility: HOSPITAL | Age: 60
Discharge: HOME | End: 2025-02-17
Payer: COMMERCIAL

## 2025-02-17 VITALS — HEIGHT: 62 IN | WEIGHT: 100 LBS | BODY MASS INDEX: 18.4 KG/M2

## 2025-02-17 DIAGNOSIS — Z01.419 ENCOUNTER FOR ANNUAL ROUTINE GYNECOLOGICAL EXAMINATION: ICD-10-CM

## 2025-02-17 PROCEDURE — 77063 BREAST TOMOSYNTHESIS BI: CPT

## 2025-02-17 PROCEDURE — 77067 SCR MAMMO BI INCL CAD: CPT

## 2025-02-21 ENCOUNTER — RESULTS FOLLOW-UP (OUTPATIENT)
Age: 60
End: 2025-02-21

## 2025-02-21 ENCOUNTER — TELEPHONE (OUTPATIENT)
Age: 60
End: 2025-02-21

## 2025-02-21 NOTE — TELEPHONE ENCOUNTER
Patient asked us to fax referral to [facility] for Dermatology    Spoke with patient - Patient is going to call us back next week with fax number, fax number is not on website, and office is only open on Tuesday and Wednesday.

## 2025-06-27 ENCOUNTER — OFFICE VISIT (OUTPATIENT)
Age: 60
End: 2025-06-27

## 2025-06-27 VITALS
WEIGHT: 104 LBS | DIASTOLIC BLOOD PRESSURE: 60 MMHG | OXYGEN SATURATION: 98 % | TEMPERATURE: 97 F | HEIGHT: 62 IN | RESPIRATION RATE: 16 BRPM | SYSTOLIC BLOOD PRESSURE: 91 MMHG | HEART RATE: 57 BPM | BODY MASS INDEX: 19.14 KG/M2

## 2025-06-27 DIAGNOSIS — D17.22 LIPOMA OF LEFT AXILLA: Primary | ICD-10-CM

## 2025-06-27 PROCEDURE — 99213 OFFICE O/P EST LOW 20 MIN: CPT | Performed by: FAMILY MEDICINE

## 2025-06-27 NOTE — PROGRESS NOTES
"Name: Theresa Rico      : 1965      MRN: [id number]  Encounter Provider: Khalida Rodriguez MD  Encounter Date: 2025   Encounter department: [facility]  :  Assessment & Plan  Lipoma of left axilla  Presents for left axilla lipoma. Pt noticed it first in  and got an US which confirmed Lipoma diagnosis. Recently states that she thinks its getting bigger in size and is a little bit more sensitive especially when she works out.   Pt is interested in getting it removed if possible.   Last mammogram in 2025 was normal.   PE: 2cm by 1cm in diameter, soft, moveable, no skin dimpling.     Surgery referral sent  Can use cold compress for pain  Orders:    Ambulatory Referral to General Surgery; Future           History of Present Illness   HPI  Pt is a 60 yo female who presents today for left axilla lipoma concerns.     Review of Systems   Constitutional:  Negative for chills and fever.   Respiratory:  Negative for cough and shortness of breath.    Cardiovascular:  Negative for chest pain and palpitations.   Gastrointestinal:  Negative for abdominal pain and vomiting.   Genitourinary:  Negative for dysuria.   Musculoskeletal:  Negative for arthralgias and back pain.   Skin:  Negative for color change and rash.   All other systems reviewed and are negative.      Objective   BP 91/60 (BP Location: Left arm, Patient Position: Sitting, Cuff Size: Adult)   Pulse 57   Temp (!) 97 °F (36.1 °C)   Resp 16   Ht 5' 2\" (1.575 m)   Wt 47.2 kg (104 lb)   LMP 10/01/2018   SpO2 98%   BMI 19.02 kg/m²      Physical Exam  Vitals and nursing note reviewed.   Constitutional:       General: She is not in acute distress.     Appearance: She is well-developed.     Cardiovascular:      Rate and Rhythm: Normal rate and regular rhythm.      Heart sounds: No murmur heard.  Pulmonary:      Effort: Pulmonary effort is normal. No respiratory distress.      Breath sounds: Normal breath sounds. "   Abdominal:      Palpations: Abdomen is soft.     Musculoskeletal:         General: No swelling.      Cervical back: Neck supple.     Skin:     General: Skin is warm and dry.      Comments: Left axilla Lipoma: 2 x 1cm. Not tender to palpation  soft, moveable, no skin dimpling.      Neurological:      Mental Status: She is alert.

## 2025-07-07 ENCOUNTER — PROCEDURE VISIT (OUTPATIENT)
Age: 60
End: 2025-07-07

## 2025-07-07 VITALS
SYSTOLIC BLOOD PRESSURE: 98 MMHG | BODY MASS INDEX: 18.77 KG/M2 | DIASTOLIC BLOOD PRESSURE: 67 MMHG | HEART RATE: 59 BPM | OXYGEN SATURATION: 98 % | TEMPERATURE: 97.6 F | WEIGHT: 102 LBS | HEIGHT: 62 IN

## 2025-07-07 DIAGNOSIS — A63.0 ANAL WART: Primary | ICD-10-CM

## 2025-07-07 PROCEDURE — 17110 DESTRUCTION B9 LES UP TO 14: CPT | Performed by: FAMILY MEDICINE

## 2025-07-07 NOTE — PROGRESS NOTES
"Name: Theresa Rico      : 1965      MRN: [id number]  Encounter Provider: Khalida Rodriguez MD  Encounter Date: 2025   Encounter department: [facility]    Assessment & Plan    1. Anal wart  Has a hx of anal warts removed by a dermatologist couple months ago. Not all anal warts were removed and dermatologist charged a $50 co-pay which was difficult for patient to pay.   Presents today to remove the remain warts located in the anogenital region. 5 anal warts free zed.    RTO in 2 weeks for a re-check    Subjective    HPI   Pt is a 58 yo female who presents today for genital warts removal.     Objective    BP 98/67 (BP Location: Left arm, Patient Position: Sitting, Cuff Size: Standard)   Pulse 59   Temp 97.6 °F (36.4 °C) (Tympanic)   Ht 5' 2\" (1.575 m)   Wt 46.3 kg (102 lb)   LMP 10/01/2018   SpO2 98%   BMI 18.66 kg/m²      Lesion Destruction    Date/Time: 2025 11:00 AM    Performed by: Khalida Rodriguez MD  Authorized by: Jonel Gracia MD    [place] Protocol:  Procedure performed by:  Consent given by: patient  Patient understanding: patient states understanding of the procedure being performed  Patient consent: the patient's understanding of the procedure matches consent given  Patient identity confirmed: verbally with patient    Procedure Details - Lesion Destruction:     Number of Lesions:  3  Lesion 1:     Body area:  Anogenital    Anogenital location:  Perianal    Malignancy: benign lesion      Destruction method: cryotherapy      Extensive destruction required?: No    Lesion 2:     Body area:  Anogenital    Malignancy: benign lesion      Destruction method: cryotherapy    Lesion 3:     Body area:  Anogenital    Malignancy: benign lesion      Destruction method: cryotherapy      Khalida Rodriguez MD    "

## 2025-07-21 ENCOUNTER — OFFICE VISIT (OUTPATIENT)
Age: 60
End: 2025-07-21

## 2025-07-21 VITALS
HEIGHT: 62 IN | BODY MASS INDEX: 18.58 KG/M2 | WEIGHT: 101 LBS | DIASTOLIC BLOOD PRESSURE: 52 MMHG | SYSTOLIC BLOOD PRESSURE: 88 MMHG | OXYGEN SATURATION: 98 % | RESPIRATION RATE: 14 BRPM | HEART RATE: 86 BPM | TEMPERATURE: 98 F

## 2025-07-21 DIAGNOSIS — A63.0 ANAL WARTS: Primary | ICD-10-CM

## 2025-07-21 PROCEDURE — 46916 CRYOSURGERY ANAL LESION(S): CPT | Performed by: FAMILY MEDICINE

## 2025-07-21 PROCEDURE — 99213 OFFICE O/P EST LOW 20 MIN: CPT | Performed by: FAMILY MEDICINE

## 2025-07-21 PROCEDURE — 17000 DESTRUCT PREMALG LESION: CPT | Performed by: FAMILY MEDICINE

## 2025-07-21 PROCEDURE — 17110 DESTRUCTION B9 LES UP TO 14: CPT | Performed by: FAMILY MEDICINE

## 2025-07-21 NOTE — PROGRESS NOTES
"Name: Theresa Rico      : 1965      MRN: [id number]  Encounter Provider: Khalida Rodriguez MD  Encounter Date: 2025   Encounter department: [facility]    Assessment & Plan    1. Anal warts  2 Anal warts freezed  Pt tolerated the procedure well    Subjective    HPI     Objective    BP (!) 88/52 (BP Location: Left arm, Patient Position: Sitting, Cuff Size: Adult)   Pulse 86   Temp 98 °F (36.7 °C) (Tympanic)   Resp 14   Ht 5' 2\" (1.575 m)   Wt 45.8 kg (101 lb)   LMP 10/01/2018   SpO2 98%   BMI 18.47 kg/m²      Lesion Destruction    Date/Time: 2025 1:00 PM    Performed by: Khalida Rodriguez MD  Authorized by: Khalida Rodriguez MD    [place] Protocol:  Consent: Verbal consent obtained. Written consent obtained  Consent given by: patient  Patient understanding: patient states understanding of the procedure being performed  Patient consent: the patient's understanding of the procedure matches consent given  Procedure consent: procedure consent matches procedure scheduled  Relevant documents: relevant documents present and verified  Test results: test results available and properly labeled  Site marked: the operative site was marked  Patient identity confirmed: verbally with patient    Procedure Details - Lesion Destruction:     Number of Lesions:  2  Lesion 1:     Body area:  Anogenital    Anogenital location:  Perianal    Malignancy: pre-malignant lesion      Destruction method: cryotherapy    Lesion 2:     Body area:  Anogenital    Anogenital location:  Perianal    Malignancy: benign lesion      Destruction method: cryotherapy      Extensive destruction required?: No      Khalida Rodriguez MD    "

## 2025-08-13 ENCOUNTER — OFFICE VISIT (OUTPATIENT)
Age: 60
End: 2025-08-13

## 2025-08-13 ENCOUNTER — HOSPITAL ENCOUNTER (EMERGENCY)
Facility: HOSPITAL | Age: 60
Discharge: HOME | End: 2025-08-13
Attending: EMERGENCY MEDICINE | Admitting: EMERGENCY MEDICINE
Payer: COMMERCIAL

## 2025-08-14 ENCOUNTER — TELEPHONE (OUTPATIENT)
Age: 60
End: 2025-08-14

## 2025-08-15 PROBLEM — S51.802A WOUND, OPEN, ARM, FOREARM, LEFT, INITIAL ENCOUNTER: Status: ACTIVE | Noted: 2025-08-15

## 2025-08-16 ENCOUNTER — HOSPITAL ENCOUNTER (EMERGENCY)
Facility: HOSPITAL | Age: 60
Discharge: HOME | End: 2025-08-16
Payer: COMMERCIAL

## 2025-08-16 VITALS
RESPIRATION RATE: 18 BRPM | DIASTOLIC BLOOD PRESSURE: 65 MMHG | HEART RATE: 78 BPM | TEMPERATURE: 97.8 F | SYSTOLIC BLOOD PRESSURE: 132 MMHG | OXYGEN SATURATION: 98 %

## 2025-08-16 DIAGNOSIS — Z23 ENCOUNTER FOR REPEAT ADMINISTRATION OF RABIES VACCINATION: Primary | ICD-10-CM

## 2025-08-16 PROCEDURE — 99283 EMERGENCY DEPT VISIT LOW MDM: CPT | Performed by: PHYSICIAN ASSISTANT

## 2025-08-16 PROCEDURE — 90675 RABIES VACCINE IM: CPT | Performed by: PHYSICIAN ASSISTANT

## 2025-08-16 PROCEDURE — 90471 IMMUNIZATION ADMIN: CPT

## 2025-08-16 RX ADMIN — RABIES VIRUS STRAIN PM-1503-3M ANTIGEN (PROPIOLACTONE INACTIVATED) AND WATER 1 ML: KIT at 12:50

## 2025-08-20 ENCOUNTER — HOSPITAL ENCOUNTER (EMERGENCY)
Facility: HOSPITAL | Age: 60
Discharge: HOME | End: 2025-08-20
Attending: EMERGENCY MEDICINE | Admitting: EMERGENCY MEDICINE
Payer: COMMERCIAL

## 2025-08-20 VITALS
HEART RATE: 57 BPM | DIASTOLIC BLOOD PRESSURE: 55 MMHG | SYSTOLIC BLOOD PRESSURE: 115 MMHG | TEMPERATURE: 97.1 F | OXYGEN SATURATION: 99 % | RESPIRATION RATE: 16 BRPM

## 2025-08-20 DIAGNOSIS — Z23 ENCOUNTER FOR REPEAT ADMINISTRATION OF RABIES VACCINATION: Primary | ICD-10-CM

## 2025-08-20 PROCEDURE — 90471 IMMUNIZATION ADMIN: CPT

## 2025-08-20 PROCEDURE — 99283 EMERGENCY DEPT VISIT LOW MDM: CPT | Performed by: EMERGENCY MEDICINE

## 2025-08-20 PROCEDURE — 90675 RABIES VACCINE IM: CPT | Performed by: EMERGENCY MEDICINE

## 2025-08-20 RX ADMIN — RABIES VIRUS STRAIN PM-1503-3M ANTIGEN (PROPIOLACTONE INACTIVATED) AND WATER 1 ML: KIT at 17:16
